# Patient Record
Sex: FEMALE | Race: AMERICAN INDIAN OR ALASKA NATIVE
[De-identification: names, ages, dates, MRNs, and addresses within clinical notes are randomized per-mention and may not be internally consistent; named-entity substitution may affect disease eponyms.]

---

## 2017-09-08 ENCOUNTER — HOSPITAL ENCOUNTER (EMERGENCY)
Dept: HOSPITAL 5 - ED | Age: 82
Discharge: HOME | End: 2017-09-08
Payer: MEDICARE

## 2017-09-08 VITALS — SYSTOLIC BLOOD PRESSURE: 129 MMHG | DIASTOLIC BLOOD PRESSURE: 47 MMHG

## 2017-09-08 DIAGNOSIS — R19.7: ICD-10-CM

## 2017-09-08 DIAGNOSIS — R10.84: Primary | ICD-10-CM

## 2017-09-08 LAB
ALBUMIN SERPL-MCNC: 3.8 G/DL (ref 3.9–5)
ALBUMIN/GLOB SERPL: 1.3 %
ALP SERPL-CCNC: 60 UNITS/L (ref 35–129)
ALT SERPL-CCNC: 8 UNITS/L (ref 7–56)
ANION GAP SERPL CALC-SCNC: 17 MMOL/L
BASOPHILS NFR BLD AUTO: 0.5 % (ref 0–1.8)
BILIRUB SERPL-MCNC: 0.7 MG/DL (ref 0.1–1.2)
BILIRUB UR QL STRIP: (no result)
BLOOD UR QL VISUAL: (no result)
BUN SERPL-MCNC: 21 MG/DL (ref 7–17)
BUN/CREAT SERPL: 26.25 %
CALCIUM SERPL-MCNC: 9.1 MG/DL (ref 8.4–10.2)
CHLORIDE SERPL-SCNC: 103.9 MMOL/L (ref 98–107)
CO2 SERPL-SCNC: 27 MMOL/L (ref 22–30)
EOSINOPHIL NFR BLD AUTO: 0.9 % (ref 0–4.3)
GLUCOSE SERPL-MCNC: 98 MG/DL (ref 65–100)
HCT VFR BLD CALC: 36 % (ref 30.3–42.9)
HGB BLD-MCNC: 12.1 GM/DL (ref 10.1–14.3)
KETONES UR STRIP-MCNC: (no result) MG/DL
LEUKOCYTE ESTERASE UR QL STRIP: (no result)
LIPASE SERPL-CCNC: 8 UNITS/L (ref 13–60)
MCH RBC QN AUTO: 30 PG (ref 28–32)
MCHC RBC AUTO-ENTMCNC: 34 % (ref 30–34)
MCV RBC AUTO: 91 FL (ref 79–97)
MUCOUS THREADS #/AREA URNS HPF: (no result) /HPF
NITRITE UR QL STRIP: (no result)
PH UR STRIP: 5 [PH] (ref 5–7)
PLATELET # BLD: 326 K/MM3 (ref 140–440)
POTASSIUM SERPL-SCNC: 4.4 MMOL/L (ref 3.6–5)
PROT SERPL-MCNC: 6.8 G/DL (ref 6.3–8.2)
PROT UR STRIP-MCNC: (no result) MG/DL
RBC # BLD AUTO: 3.97 M/MM3 (ref 3.65–5.03)
RBC #/AREA URNS HPF: 2 /HPF (ref 0–6)
SODIUM SERPL-SCNC: 143 MMOL/L (ref 137–145)
UROBILINOGEN UR-MCNC: < 2 MG/DL (ref ?–2)
WBC # BLD AUTO: 6.8 K/MM3 (ref 4.5–11)
WBC #/AREA URNS HPF: 1 /HPF (ref 0–6)

## 2017-09-08 PROCEDURE — 99284 EMERGENCY DEPT VISIT MOD MDM: CPT

## 2017-09-08 PROCEDURE — 80053 COMPREHEN METABOLIC PANEL: CPT

## 2017-09-08 PROCEDURE — 83690 ASSAY OF LIPASE: CPT

## 2017-09-08 PROCEDURE — 81001 URINALYSIS AUTO W/SCOPE: CPT

## 2017-09-08 PROCEDURE — 85025 COMPLETE CBC W/AUTO DIFF WBC: CPT

## 2017-09-08 PROCEDURE — 36415 COLL VENOUS BLD VENIPUNCTURE: CPT

## 2017-09-08 PROCEDURE — 74177 CT ABD & PELVIS W/CONTRAST: CPT

## 2017-09-08 NOTE — CAT SCAN REPORT
CT ABDOMEN AND PELVIS WITH CONTRAST



INDICATION: Abdominal pain.



COMPARISON: None similar at this institution.



FINDINGS: Abdomen and pelvis CT performed following intravenous 

administration of 100 cc of Omnipaque 300.



LUNG BASES: Cardiac shift to the right incompletely imaged.  Mild 

linear lingular scarring or atelectasis.  Mild nonspecific distal 

esophageal prominence/thickening.  No effusions.



ABDOMEN: Long segment mid to distal small bowel involvement noted with 

diffuse wall prominence/thickening, mild enhancement and increased 

mesenteric vascularity/stranding with minimal fluid along the 

mesenteric leaves as well, more so noted in the mid to lower abdomen 

and pelvis.  No bowel obstruction.  Stomach and proximal small bowel 

grossly within normal limits.  Mild colonic stool.  Small, somewhat 

lobulated left upper quadrant soft tissue may represent regenerated 

splenules, axial series 2, images 16-30.  Liver, gallbladder, pancreas, 

adrenals, aorta, IVC and kidneys within normal limits except for 

numerous bilateral renal cortical hypodensities, many subcentimeter 

while the largest left lower renal cortical cyst is 1.1 cm.  An 

approximately 1 cm right renal calyceal diverticulum also noted, axial 

image 9, series 4.  No ascites or definite significant adenopathy.  

Approximately 3.7 cm left flank hernia containing fat noted with a 

transverse neck of approximately 1.6 cm, axial image 35, series 2.



PELVIS: Small pelvic free fluid measures 20 HU.  Approximately 1 cm 

probably calcified fibroid on the left.  Small right hemipelvic 

phlebolith.  Rectal stool.  Normal urinary bladder.  No size 

significant adenopathy.  Bilateral inguinal canal region hernias, 

approximately 1.2 cm on the right and contains clear fat, axial image 

67, series 2 while measures approximately 2.8 cm on the left and 

contains stranded fat.



Approximately 7 mm anterolisthesis of L5 over S1 with vacuum disc 

phenomenon and severe disc narrowing.  T9 superior endplate Schmorl's 

nodes and approximately 25% loss of height also noted.  Mild lower 

lumbar degenerative spurring.  Demineralized bones.  Severe asymmetric 

right hip degenerative changes with joint space obliteration, spurring, 

sclerosis and numerous subchondral cysts.



CONCLUSION:



1. Long segment mid to distal small bowel wall thickening/inflammatory 

changes, worrisome for inflammatory bowel disease or enteritis, as 

described.  No evidence of bowel obstruction.



2.  Various other findings, including cardiac shift to the right, 

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 

hypodensities, left flank and bilateral inguinal hernias, L5-S1 

spondylolisthesis and severe right hip arthropathy, amongst others, as 

described.



Thank you for the opportunity to participate in this patient's care.

## 2017-09-08 NOTE — EMERGENCY DEPARTMENT REPORT
HPI





- General


Chief Complaint: Abdominal Pain


Time Seen by Provider: 09/08/17 12:53





- HPI


HPI: 


This is an 82 year-old  female presents to the emergency 

department from home with complaint of a one-week history of intermittent 

generalized abdominal pain and recently some diarrhea.  She says that her 

abdominal pain is very mild at this time if there at all, but when she does 

have this abdominal pain it is so severe that "I would not be able to talk to 

you."  She denies any fever, back pain, dysuria, vaginal bleeding, vaginal 

discharge.  She has not taken anything for her symptoms prior to presentation.  

No recent travel or sick contacts at home.  She denies any past medical 

history.  Her primary care physician is Dr. Zeina Naranjo.





ED Past Medical Hx





- Past Medical History


Previous Medical History?: Yes


Additional medical history: anemia





- Surgical History


Past Surgical History?: Yes


Additional Surgical History: arm surgery





- Social History


Smoking Status: Never Smoker


Substance Use Type: None





ED Review of Systems


ROS: 


Stated complaint: STOMACH PAIN/DIARRHEA


Other details as noted in HPI





Comment: All other systems reviewed and negative


Constitutional: denies: chills, fever


Eyes: denies: eye pain, eye discharge, vision change


ENT: denies: ear pain, throat pain


Respiratory: denies: cough, shortness of breath, wheezing


Cardiovascular: denies: chest pain, palpitations


Gastrointestinal: abdominal pain, diarrhea.  denies: nausea, vomiting


Genitourinary: denies: urgency, dysuria, discharge


Musculoskeletal: denies: back pain, joint swelling, arthralgia


Skin: denies: rash, lesions


Neurological: denies: headache, weakness, paresthesias





Physical Exam





- Physical Exam


Vital Signs: 


 Vital Signs











  09/08/17 09/08/17





  12:17 12:48


 


Temperature 97.9 F 97.6 F


 


Pulse Rate 52 L 50 L


 


Respiratory 18 18





Rate  


 


Blood Pressure 125/64 


 


Blood Pressure  115/48





[Right]  


 


O2 Sat by Pulse 99 99





Oximetry  











Physical Exam: 


GENERAL: The patient is well-developed well-nourished.


HENT: Normocephalic.  Atraumatic.    Patient has moist mucous membranes.


EYES: Extraocular motions are intact.  Pupils equal reactive to light 

bilaterally.


NECK: Supple.  Trachea is midline.


CHEST/LUNGS: Clear to auscultation.  There is no respiratory distress noted.


HEART/CARDIOVASCULAR: Regular.  There is no tachycardia.  There is no gallop 

rub or murmur.


ABDOMEN: Abdomen is soft, nontender.  Patient has normal bowel sounds.  There 

is no abdominal distention.


SKIN: There is no rash.  There is no edema.  There is no diaphoresis.


NEURO: The patient is awake, alert.  The patient is cooperative.  The patient 

has no focal neurologic deficits.  The patient has normal speech and gait.


MUSCULOSKELETAL: There is no tenderness or deformity.  There is no limitation 

range of motion.  There is no evidence of acute injury.








ED Course


 Vital Signs











  09/08/17 09/08/17





  12:17 12:48


 


Temperature 97.9 F 97.6 F


 


Pulse Rate 52 L 50 L


 


Respiratory 18 18





Rate  


 


Blood Pressure 125/64 


 


Blood Pressure  115/48





[Right]  


 


O2 Sat by Pulse 99 99





Oximetry  














ED Medical Decision Making





- Lab Data


Result diagrams: 


 09/08/17 12:21





 09/08/17 12:21





- Radiology Data


Radiology results: report reviewed





CT ABDOMEN AND PELVIS WITH CONTRAST 





INDICATION: Abdominal pain. 





COMPARISON: None similar at this institution. 





FINDINGS: Abdomen and pelvis CT performed following intravenous 


administration of 100 cc of Omnipaque 300. 





LUNG BASES: Cardiac shift to the right incompletely imaged. Mild 


linear lingular scarring or atelectasis. Mild nonspecific distal 


esophageal prominence/thickening. No effusions. 





ABDOMEN: Long segment mid to distal small bowel involvement noted with 


diffuse wall prominence/thickening, mild enhancement and increased 


mesenteric vascularity/stranding with minimal fluid along the 


mesenteric leaves as well, more so noted in the mid to lower abdomen 


and pelvis. No bowel obstruction. Stomach and proximal small bowel 


grossly within normal limits. Mild colonic stool. Small, somewhat 


lobulated left upper quadrant soft tissue may represent regenerated 


splenules, axial series 2, images 16-30. Liver, gallbladder, pancreas, 


adrenals, aorta, IVC and kidneys within normal limits except for 


numerous bilateral renal cortical hypodensities, many subcentimeter 


while the largest left lower renal cortical cyst is 1.1 cm. An 


approximately 1 cm right renal calyceal diverticulum also noted, axial 


image 9, series 4. No ascites or definite significant adenopathy. 


Approximately 3.7 cm left flank hernia containing fat noted with a 


transverse neck of approximately 1.6 cm, axial image 35, series 2. 





PELVIS: Small pelvic free fluid measures 20 HU. Approximately 1 cm 


probably calcified fibroid on the left. Small right hemipelvic 


phlebolith. Rectal stool. Normal urinary bladder. No size 


significant adenopathy. Bilateral inguinal canal region hernias, 


approximately 1.2 cm on the right and contains clear fat, axial image 


67, series 2 while measures approximately 2.8 cm on the left and 


contains stranded fat. 





Approximately 7 mm anterolisthesis of L5 over S1 with vacuum disc 


phenomenon and severe disc narrowing. T9 superior endplate Schmorl's 


nodes and approximately 25% loss of height also noted. Mild lower 


lumbar degenerative spurring. Demineralized bones. Severe asymmetric 


right hip degenerative changes with joint space obliteration, spurring, 


sclerosis and numerous subchondral cysts. 





CONCLUSION: 





1. Long segment mid to distal small bowel wall thickening/inflammatory 


changes, worrisome for inflammatory bowel disease or enteritis, as 


described. No evidence of bowel obstruction. 





2. Various other findings, including cardiac shift to the right, 


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 


hypodensities, left flank and bilateral inguinal hernias, L5-S1 


spondylolisthesis and severe right hip arthropathy, amongst others, as 


described. 





- Medical Decision Making


82-year-old female presents to the emergency department with a one-week history 

of intermittent abdominal pain and some recent diarrhea.  However she is 

asymptomatic at this time and throughout her ED course.  Labs are mostly 

unremarkable.  Vital signs stable.  CT of the abdomen and pelvis shows some 

inflammation and/or bowel thickening that could be IBS versus enteritis.  

Otherwise no acute process.  Patient appears stable for discharge home.  She 

has a primary care for follow-up and has been given gastroenterology.  She will 

return to the ER for any worsening of her symptoms or any acute distress.








- Differential Diagnosis


gastroenteritis, malignancy, IBS, diverticulitis


Critical Care Time: No


Critical care attestation.: 


If time is entered above; I have spent that time in minutes in the direct care 

of this critically ill patient, excluding procedure time.








ED Disposition


Clinical Impression: 


Abdominal pain


Qualifiers:


 Abdominal location: unspecified location Qualified Code(s): R10.9 - 

Unspecified abdominal pain





Diarrhea


Qualifiers:


 Diarrhea type: unspecified type Qualified Code(s): R19.7 - Diarrhea, 

unspecified





Disposition: DC-01 TO HOME OR SELFCARE


Is pt being admited?: No


Condition: Stable


Instructions:  Abdominal Pain (ED)


Additional Instructions: 


Follow-up with your primary care doctor in the next few days.  I have given you 

a referral for a local gastroenterologist, Dr. Ortiz, to follow up regarding 

her intermittent abdominal pains.  Return to the emergency Department with any 

worsening of your symptoms or any acute distress.


Referrals: 


PRIMARY CARE,MD [Primary Care Provider] - 3-5 Days


ANITA ORTIZ MD [Staff Physician] - 3-5 Days


ZEINA NARANJO MD [Staff Physician] - 3-5 Days


Time of Disposition: 16:54

## 2019-10-09 ENCOUNTER — HOSPITAL ENCOUNTER (OUTPATIENT)
Dept: HOSPITAL 5 - ED | Age: 84
Setting detail: OBSERVATION
LOS: 2 days | Discharge: HOME HEALTH | End: 2019-10-11
Attending: INTERNAL MEDICINE | Admitting: INTERNAL MEDICINE
Payer: MEDICARE

## 2019-10-09 DIAGNOSIS — R53.81: ICD-10-CM

## 2019-10-09 DIAGNOSIS — K21.9: ICD-10-CM

## 2019-10-09 DIAGNOSIS — Z86.2: ICD-10-CM

## 2019-10-09 DIAGNOSIS — R11.2: ICD-10-CM

## 2019-10-09 DIAGNOSIS — M19.90: ICD-10-CM

## 2019-10-09 DIAGNOSIS — K52.9: Primary | ICD-10-CM

## 2019-10-09 DIAGNOSIS — R27.0: ICD-10-CM

## 2019-10-09 DIAGNOSIS — Z79.899: ICD-10-CM

## 2019-10-09 LAB
ALBUMIN SERPL-MCNC: 3.6 G/DL (ref 3.9–5)
ALT SERPL-CCNC: 10 UNITS/L (ref 7–56)
APTT BLD: 33.8 SEC. (ref 24.2–36.6)
BACTERIA #/AREA URNS HPF: (no result) /HPF
BAND NEUTROPHILS # (MANUAL): 0 K/MM3
BILIRUB UR QL STRIP: (no result)
BLOOD UR QL VISUAL: (no result)
BUN SERPL-MCNC: 17 MG/DL (ref 7–17)
BUN/CREAT SERPL: 28 %
CALCIUM SERPL-MCNC: 9 MG/DL (ref 8.4–10.2)
HCT VFR BLD CALC: 33.7 % (ref 30.3–42.9)
HEMOLYSIS INDEX: 7
HGB BLD-MCNC: 11.1 GM/DL (ref 10.1–14.3)
INR PPP: 1.07 (ref 0.87–1.13)
MCHC RBC AUTO-ENTMCNC: 33 % (ref 30–34)
MCV RBC AUTO: 93 FL (ref 79–97)
MUCOUS THREADS #/AREA URNS HPF: (no result) /HPF
MYELOCYTES # (MANUAL): 0 K/MM3
PH UR STRIP: 5 [PH] (ref 5–7)
PLATELET # BLD: 263 K/MM3 (ref 140–440)
PROMYELOCYTES # (MANUAL): 0 K/MM3
PROT UR STRIP-MCNC: (no result) MG/DL
RBC # BLD AUTO: 3.62 M/MM3 (ref 3.65–5.03)
RBC #/AREA URNS HPF: 3 /HPF (ref 0–6)
TOTAL CELLS COUNTED BLD: 100
UROBILINOGEN UR-MCNC: < 2 MG/DL (ref ?–2)
WBC #/AREA URNS HPF: 2 /HPF (ref 0–6)

## 2019-10-09 PROCEDURE — 82550 ASSAY OF CK (CPK): CPT

## 2019-10-09 PROCEDURE — 70450 CT HEAD/BRAIN W/O DYE: CPT

## 2019-10-09 PROCEDURE — 85025 COMPLETE CBC W/AUTO DIFF WBC: CPT

## 2019-10-09 PROCEDURE — 85007 BL SMEAR W/DIFF WBC COUNT: CPT

## 2019-10-09 PROCEDURE — G0008 ADMIN INFLUENZA VIRUS VAC: HCPCS

## 2019-10-09 PROCEDURE — 99284 EMERGENCY DEPT VISIT MOD MDM: CPT

## 2019-10-09 PROCEDURE — 80320 DRUG SCREEN QUANTALCOHOLS: CPT

## 2019-10-09 PROCEDURE — 36415 COLL VENOUS BLD VENIPUNCTURE: CPT

## 2019-10-09 PROCEDURE — 90686 IIV4 VACC NO PRSV 0.5 ML IM: CPT

## 2019-10-09 PROCEDURE — 80053 COMPREHEN METABOLIC PANEL: CPT

## 2019-10-09 PROCEDURE — 85610 PROTHROMBIN TIME: CPT

## 2019-10-09 PROCEDURE — 93010 ELECTROCARDIOGRAM REPORT: CPT

## 2019-10-09 PROCEDURE — 85670 THROMBIN TIME PLASMA: CPT

## 2019-10-09 PROCEDURE — 82553 CREATINE MB FRACTION: CPT

## 2019-10-09 PROCEDURE — 84443 ASSAY THYROID STIM HORMONE: CPT

## 2019-10-09 PROCEDURE — G0480 DRUG TEST DEF 1-7 CLASSES: HCPCS

## 2019-10-09 PROCEDURE — 96361 HYDRATE IV INFUSION ADD-ON: CPT

## 2019-10-09 PROCEDURE — 93005 ELECTROCARDIOGRAM TRACING: CPT

## 2019-10-09 PROCEDURE — 85730 THROMBOPLASTIN TIME PARTIAL: CPT

## 2019-10-09 PROCEDURE — 84484 ASSAY OF TROPONIN QUANT: CPT

## 2019-10-09 PROCEDURE — 83735 ASSAY OF MAGNESIUM: CPT

## 2019-10-09 PROCEDURE — 87040 BLOOD CULTURE FOR BACTERIA: CPT

## 2019-10-09 PROCEDURE — G0378 HOSPITAL OBSERVATION PER HR: HCPCS

## 2019-10-09 PROCEDURE — 74176 CT ABD & PELVIS W/O CONTRAST: CPT

## 2019-10-09 PROCEDURE — 70551 MRI BRAIN STEM W/O DYE: CPT

## 2019-10-09 PROCEDURE — 71045 X-RAY EXAM CHEST 1 VIEW: CPT

## 2019-10-09 PROCEDURE — 87086 URINE CULTURE/COLONY COUNT: CPT

## 2019-10-09 PROCEDURE — 90471 IMMUNIZATION ADMIN: CPT

## 2019-10-09 PROCEDURE — 81001 URINALYSIS AUTO W/SCOPE: CPT

## 2019-10-09 PROCEDURE — 83036 HEMOGLOBIN GLYCOSYLATED A1C: CPT

## 2019-10-09 PROCEDURE — 97161 PT EVAL LOW COMPLEX 20 MIN: CPT

## 2019-10-09 PROCEDURE — 96360 HYDRATION IV INFUSION INIT: CPT

## 2019-10-09 RX ADMIN — MIRTAZAPINE SCH MG: 15 TABLET, FILM COATED ORAL at 22:48

## 2019-10-09 RX ADMIN — FAMOTIDINE SCH MG: 20 TABLET ORAL at 22:48

## 2019-10-09 RX ADMIN — CALCIUM CARBONATE (ANTACID) CHEW TAB 500 MG SCH MG: 500 CHEW TAB at 22:48

## 2019-10-09 RX ADMIN — Medication SCH ML: at 22:49

## 2019-10-09 NOTE — CAT SCAN REPORT
CT HEAD WITHOUT CONTRAST



HISTORY: Stroke symptoms.



TECHNIQUE:  Axial imaging performed from the skull apex through the skull base without the use of con
trast.  All CT scans at this location are performed using CT dose reduction for ALARA by means of aut
omated exposure control. 



COMPARISON:  None



FINDINGS:  



Parenchyma:  No acute intracranial hemorrhage or parenchymal abnormality..  Mild hypoattenuation thro
ughout the white matter is noted and consistent with chronic microvascular ischemic disease.

Ventricles:  There is mild diffuse brain atrophy with commensurate ventricular enlargement which is l
ikely age appropriate.  

Soft tissues:  Soft tissues including the orbits appear normal.   

Bones:  No acute osseous abnormality.   

Sinuses:  Sinuses and mastoid air cells are clear.





IMPRESSION: No acute abnormality. Senescent changes.



Signer Name: Dionte Pal Jr, MD 

Signed: 10/9/2019 11:22 AM

 Workstation Name: [id number]

## 2019-10-09 NOTE — EMERGENCY DEPARTMENT REPORT
ED General Adult HPI





- General


Chief complaint: Nausea/Vomiting/Diarrhea


Stated complaint: VOMITING/DIARRHEA


Time Seen by Provider: 10/09/19 09:27


Source: patient, family, EMS (ems notes not available at time of  chart 

dictation), RN notes reviewed, old records reviewed


Mode of arrival: Stretcher


Limitations: No Limitations





- History of Present Illness


Initial comments: 





This is an 84-year-old female.





Her primary care doctor is Dr. Sandoval.





Past medical history includes possible anemia or arm surgery.





History of primarily obtained from her son.





Apparently, the patient is brought to the hospital for resolves "dizziness", and

generalized weakness.  Patient indicates that she was in her usual state of 

health, walking down the stairs, when she heard some loud noise outside, and 

then began to feel dizzy.  She cannot describe the qualitative nature of the 

dizziness.  She typically walks with a walker, and as per her son, the indicates

that she was having difficulty and been living with a walker.  She denies 

physical pain at this time.  She felt nauseous, and may have vomited once.





The patient states that she is back to her baseline at this time.  She has no 

complaints at this time.  Her son is at the bedside and corroborates that she is

at her baseline.  Son offers additional history, apparently, the patient's had 

decreased appetite, weight loss, her primary care doctor put her on a 

"antidepressant", but he does not know what medicine is.  The patient and the 

son do not have a list of her medications at this time.








-: Sudden


Improves with: none


Worsens with: none





- Related Data


                                    Allergies











Allergy/AdvReac Type Severity Reaction Status Date / Time


 


No Known Allergies Allergy   Unverified 17 12:18














ED Review of Systems


ROS: 


Stated complaint: VOMITING/DIARRHEA


Other details as noted in HPI





Constitutional: malaise, weakness.  denies: fever


Eyes: denies: eye discharge


ENT: denies: epistaxis


Respiratory: denies: cough


Cardiovascular: denies: syncope


Gastrointestinal: nausea, vomiting


Genitourinary: denies: dysuria


Musculoskeletal: denies: back pain


Skin: denies: lesions


Neurological: weakness


Psychiatric: depression


Hematological/Lymphatic: denies: easy bleeding





ED Past Medical Hx





- Past Medical History


Additional medical history: anemia





- Surgical History


Additional Surgical History: arm surgery





- Social History


Smoking Status: Unknown if ever smoked





ED Physical Exam





- General


Limitations: No Limitations, Other (the patient is a poor historian.)


General appearance: alert, in no apparent distress





- Head


Head exam: Present: atraumatic, normocephalic





- Eye


Eye exam: Present: normal appearance, PERRL, EOMI, other (visual acuity is 

intact to finger counting and color perception at close distance).  Absent: 

nystagmus





- ENT


ENT exam: Present: normal exam, normal orophraynx, mucous membranes moist, norm

al external ear exam





- Neck


Neck exam: Present: normal inspection, full ROM.  Absent: tenderness, 

meningismus





- Respiratory


Respiratory exam: Present: normal lung sounds bilaterally.  Absent: respiratory 

distress





- Cardiovascular


Cardiovascular Exam: Present: normal rhythm, bradycardia, normal heart sounds.  

Absent: tachycardia, irregular rhythm, systolic murmur, diastolic murmur, rubs, 

gallop





- GI/Abdominal


GI/Abdominal exam: Present: soft.  Absent: distended, tenderness, guarding, 

rebound, rigid, pulsatile mass





- Extremities Exam


Extremities exam: Present: normal inspection, full ROM, other (2+ pulses noted 

in the bilateral upper, lower extremities.  Compartments soft.  No long bony 

tenderness.  The pelvis is stable.).  Absent: calf tenderness





- Back Exam


Back exam: Present: normal inspection.  Absent: tenderness, CVA tenderness (R), 

CVA tenderness (L), paraspinal tenderness, vertebral tenderness





- Neurological Exam


Neurological exam: Present: alert, oriented X3, other (there is no facial droop.

 The tongue is midline.  The extraocular movements are intact bilaterally.  The 

tongue is midline.  Speaking in complete sentences.  There is no past-pointing. 

There is no pronator drift.  Patient has difficulty with heel-to-shin secondary 

to chronic arthritic pain)





- Psychiatric


Psychiatric exam: Present: normal affect, normal mood





- Skin


Skin exam: Present: warm, dry, intact, normal color.  Absent: rash





ED Course


                                   Vital Signs











  10/09/19 10/09/19 10/09/19





  09:38 09:46 12:00


 


Temperature   96.6 F L


 


Pulse Rate  54 L 56 L


 


Respiratory  12 16





Rate   


 


Blood Pressure  128/56 


 


Blood Pressure   124/51





[Right]   


 


O2 Sat by Pulse 98  96





Oximetry   














  10/09/19





  12:05


 


Temperature 


 


Pulse Rate 


 


Respiratory 14





Rate 


 


Blood Pressure 


 


Blood Pressure 





[Right] 


 


O2 Sat by Pulse 





Oximetry 














- Reevaluation(s)


Reevaluation #1: 





10/09/19 11:32


Differential diagnosis, including not limited to: Orthostasis, vagal event, 

structural cardiac disease, pneumonia, stroke, TIA, urinary tract infection, 

deconditioning, peripheral vertigo, obstruction, colitis, diverticulitis








Assessment and plan: 84-year-old female brought to the hospital for resolved 

unsteady gait, dizziness, nausea.  Symptoms are nonspecific.  She is back to her

 baseline at this time.  She is not tachycardic or hypoxic.  Abdomen soft and 

benign.  CT scan of the brain, abdomen and pelvis ordered.  Screening laboratory

 studies, repeat EKG, urinalysis, medication reconciliation ordered.  Patient 

currently has NIH score of 0, therefore, TPA candidate.








Her examination at this time is not suggestive of a large vessel occlusion.


Therefore, an emergent CT angiogram of the head and neck is not indicated.


Reevaluation #2: 





10/09/19 12:23


CT scan of the brain is negative for acute disease.  CT scan abdomen and pelvis 

shows chronic findings and constipation.  Urinalysis is pending at this time.  

Case presented to Hospital physician, Dr. Bella











ED Medical Decision Making





- Lab Data


Result diagrams: 


                                 10/09/19 10:10





                                 10/09/19 10:10








                                   Lab Results











  10/09/19 10/09/19 10/09/19 Range/Units





  10:10 10:10 10:10 


 


WBC  15.0 H    (4.5-11.0)  K/mm3


 


RBC  3.62 L    (3.65-5.03)  M/mm3


 


Hgb  11.1    (10.1-14.3)  gm/dl


 


Hct  33.7    (30.3-42.9)  %


 


MCV  93    (79-97)  fl


 


MCH  31    (28-32)  pg


 


MCHC  33    (30-34)  %


 


RDW  13.5    (13.2-15.2)  %


 


Plt Count  263    (140-440)  K/mm3


 


Add Manual Diff  Complete    


 


Total Counted  100    


 


Seg Neutrophils %  Np    


 


Seg Neuts % (Manual)  94.0 H    (40.0-70.0)  %


 


Band Neutrophils %  0    %


 


Lymphocytes % (Manual)  2.0 L    (13.4-35.0)  %


 


Reactive Lymphs % (Man)  0    %


 


Monocytes % (Manual)  4.0    (0.0-7.3)  %


 


Eosinophils % (Manual)  0    (0.0-4.3)  %


 


Basophils % (Manual)  0    (0.0-1.8)  %


 


Metamyelocytes %  0    %


 


Myelocytes %  0    %


 


Promyelocytes %  0    %


 


Blast Cells %  0    %


 


Nucleated RBC %  Not Reportable    


 


Seg Neutrophils # Man  14.1 H    (1.8-7.7)  K/mm3


 


Band Neutrophils #  0.0    K/mm3


 


Lymphocytes # (Manual)  0.3 L    (1.2-5.4)  K/mm3


 


Abs React Lymphs (Man)  0.0    K/mm3


 


Monocytes # (Manual)  0.6    (0.0-0.8)  K/mm3


 


Eosinophils # (Manual)  0.0    (0.0-0.4)  K/mm3


 


Basophils # (Manual)  0.0    (0.0-0.1)  K/mm3


 


Metamyelocytes #  0.0    K/mm3


 


Myelocytes #  0.0    K/mm3


 


Promyelocytes #  0.0    K/mm3


 


Blast Cells #  0.0    K/mm3


 


WBC Morphology  Not Reportable    


 


Hypersegmented Neuts  Not Reportable    


 


Hyposegmented Neuts  Not Reportable    


 


Hypogranular Neuts  Not Reportable    


 


Smudge Cells  Not Reportable    


 


Toxic Granulation  Not Reportable    


 


Toxic Vacuolation  Not Reportable    


 


Dohle Bodies  Not Reportable    


 


Pelger-Huet Anomaly  Not Reportable    


 


George Rods  Not Reportable    


 


Platelet Estimate  Consistent w auto    


 


Clumped Platelets  Not Reportable    


 


Plt Clumps, EDTA  Not Reportable    


 


Large Platelets  Not Reportable    


 


Giant Platelets  Not Reportable    


 


Platelet Satelliting  Not Reportable    


 


Plt Morphology Comment  Not Reportable    


 


RBC Morphology  Normal    


 


Dimorphic RBCs  Not Reportable    


 


Polychromasia  Not Reportable    


 


Hypochromasia  Not Reportable    


 


Poikilocytosis  Not Reportable    


 


Anisocytosis  Not Reportable    


 


Microcytosis  Not Reportable    


 


Macrocytosis  Not Reportable    


 


Spherocytes  Not Reportable    


 


Pappenheimer Bodies  Not Reportable    


 


Sickle Cells  Not Reportable    


 


Target Cells  Not Reportable    


 


Tear Drop Cells  Not Reportable    


 


Ovalocytes  Not Reportable    


 


Helmet Cells  Not Reportable    


 


[place] Bodies  Not Reportable    


 


Cabot Rings  Not Reportable    


 


Christopher Cells  Not Reportable    


 


Bite Cells  Not Reportable    


 


Crenated Cell  Not Reportable    


 


Elliptocytes  Not Reportable    


 


Acanthocytes (Spur)  Not Reportable    


 


Rouleaux  Not Reportable    


 


Hemoglobin C Crystals  Not Reportable    


 


Schistocytes  Not Reportable    


 


Malaria parasites  Not Reportable    


 


Kobe Bodies  Not Reportable    


 


Hem Pathologist Commnt  No    


 


PT   13.6   (12.2-14.9)  Sec.


 


INR   1.07   (0.87-1.13)  


 


APTT   33.8   (24.2-36.6)  Sec.


 


Thrombin Time   15.1   (15.1-19.6)  Sec.


 


Sodium    141  (137-145)  mmol/L


 


Potassium    4.3  (3.6-5.0)  mmol/L


 


Chloride    102.8  ()  mmol/L


 


Carbon Dioxide    26  (22-30)  mmol/L


 


Anion Gap    17  mmol/L


 


BUN    17  (7-17)  mg/dL


 


Creatinine    0.6 L  (0.7-1.2)  mg/dL


 


Estimated GFR    > 60  ml/min


 


BUN/Creatinine Ratio    28  %


 


Glucose    125 H  ()  mg/dL


 


Calcium    9.0  (8.4-10.2)  mg/dL


 


Magnesium    2.00  (1.7-2.3)  mg/dL


 


Total Bilirubin    1.00  (0.1-1.2)  mg/dL


 


AST    16  (5-40)  units/L


 


ALT    10  (7-56)  units/L


 


Alkaline Phosphatase    67  ()  units/L


 


Total Creatine Kinase    68  ()  units/L


 


CK-MB (CK-2)    2.5  (0.0-4.0)  ng/mL


 


CK-MB (CK-2) Rel Index    3.6  (0-4)  


 


Troponin T    < 0.010  (0.00-0.029)  ng/mL


 


Total Protein    7.0  (6.3-8.2)  g/dL


 


Albumin    3.6 L  (3.9-5)  g/dL


 


Albumin/Globulin Ratio    1.1  %


 


TSH     (0.270-4.200)  mlU/mL


 


Salicylates     (2.8-20.0)  mg/dL














  10/09/19 10/09/19 Range/Units





  10:10 10:10 


 


WBC    (4.5-11.0)  K/mm3


 


RBC    (3.65-5.03)  M/mm3


 


Hgb    (10.1-14.3)  gm/dl


 


Hct    (30.3-42.9)  %


 


MCV    (79-97)  fl


 


MCH    (28-32)  pg


 


MCHC    (30-34)  %


 


RDW    (13.2-15.2)  %


 


Plt Count    (140-440)  K/mm3


 


Add Manual Diff    


 


Total Counted    


 


Seg Neutrophils %    


 


Seg Neuts % (Manual)    (40.0-70.0)  %


 


Band Neutrophils %    %


 


Lymphocytes % (Manual)    (13.4-35.0)  %


 


Reactive Lymphs % (Man)    %


 


Monocytes % (Manual)    (0.0-7.3)  %


 


Eosinophils % (Manual)    (0.0-4.3)  %


 


Basophils % (Manual)    (0.0-1.8)  %


 


Metamyelocytes %    %


 


Myelocytes %    %


 


Promyelocytes %    %


 


Blast Cells %    %


 


Nucleated RBC %    


 


Seg Neutrophils # Man    (1.8-7.7)  K/mm3


 


Band Neutrophils #    K/mm3


 


Lymphocytes # (Manual)    (1.2-5.4)  K/mm3


 


Abs React Lymphs (Man)    K/mm3


 


Monocytes # (Manual)    (0.0-0.8)  K/mm3


 


Eosinophils # (Manual)    (0.0-0.4)  K/mm3


 


Basophils # (Manual)    (0.0-0.1)  K/mm3


 


Metamyelocytes #    K/mm3


 


Myelocytes #    K/mm3


 


Promyelocytes #    K/mm3


 


Blast Cells #    K/mm3


 


WBC Morphology    


 


Hypersegmented Neuts    


 


Hyposegmented Neuts    


 


Hypogranular Neuts    


 


Smudge Cells    


 


Toxic Granulation    


 


Toxic Vacuolation    


 


Dohle Bodies    


 


Pelger-Huet Anomaly    


 


George Rods    


 


Platelet Estimate    


 


Clumped Platelets    


 


Plt Clumps, EDTA    


 


Large Platelets    


 


Giant Platelets    


 


Platelet Satelliting    


 


Plt Morphology Comment    


 


RBC Morphology    


 


Dimorphic RBCs    


 


Polychromasia    


 


Hypochromasia    


 


Poikilocytosis    


 


Anisocytosis    


 


Microcytosis    


 


Macrocytosis    


 


Spherocytes    


 


Pappenheimer Bodies    


 


Sickle Cells    


 


Target Cells    


 


Tear Drop Cells    


 


Ovalocytes    


 


Helmet Cells    


 


[place] Bodies    


 


Cabot Rings    


 


Christopher Cells    


 


Bite Cells    


 


Crenated Cell    


 


Elliptocytes    


 


Acanthocytes (Spur)    


 


Rouleaux    


 


Hemoglobin C Crystals    


 


Schistocytes    


 


Malaria parasites    


 


Kobe Bodies    


 


Hem Pathologist Commnt    


 


PT    (12.2-14.9)  Sec.


 


INR    (0.87-1.13)  


 


APTT    (24.2-36.6)  Sec.


 


Thrombin Time    (15.1-19.6)  Sec.


 


Sodium    (137-145)  mmol/L


 


Potassium    (3.6-5.0)  mmol/L


 


Chloride    ()  mmol/L


 


Carbon Dioxide    (22-30)  mmol/L


 


Anion Gap    mmol/L


 


BUN    (7-17)  mg/dL


 


Creatinine    (0.7-1.2)  mg/dL


 


Estimated GFR    ml/min


 


BUN/Creatinine Ratio    %


 


Glucose    ()  mg/dL


 


Calcium    (8.4-10.2)  mg/dL


 


Magnesium    (1.7-2.3)  mg/dL


 


Total Bilirubin    (0.1-1.2)  mg/dL


 


AST    (5-40)  units/L


 


ALT    (7-56)  units/L


 


Alkaline Phosphatase    ()  units/L


 


Total Creatine Kinase    ()  units/L


 


CK-MB (CK-2)    (0.0-4.0)  ng/mL


 


CK-MB (CK-2) Rel Index    (0-4)  


 


Troponin T    (0.00-0.029)  ng/mL


 


Total Protein    (6.3-8.2)  g/dL


 


Albumin    (3.9-5)  g/dL


 


Albumin/Globulin Ratio    %


 


TSH  3.800   (0.270-4.200)  mlU/mL


 


Salicylates   < 0.3 L  (2.8-20.0)  mg/dL











                                   Vital Signs











  10/09/19 10/09/19 10/09/19





  09:38 09:46 12:00


 


Temperature   96.6 F L


 


Pulse Rate  54 L 56 L


 


Respiratory  12 16





Rate   


 


Blood Pressure  128/56 


 


Blood Pressure   124/51





[Right]   


 


O2 Sat by Pulse 98  96





Oximetry   














  10/09/19





  12:05


 


Temperature 


 


Pulse Rate 


 


Respiratory 14





Rate 


 


Blood Pressure 


 


Blood Pressure 





[Right] 


 


O2 Sat by Pulse 





Oximetry 














- EKG Data


-: EKG Interpreted by Me


EKG shows normal: sinus rhythm


Rate: bradycardia





- EKG Data





10/09/19 11:32


The EKG is limited by motion artifact.  Rate is 51 bpm, normal axis, QTC is 407 

ms, there is significant motion artifact, however, the EKG appears to be a sinus

 rhythm.  Repeat EKG is ordered





- Radiology Data


Radiology results: report reviewed, image reviewed





Print Report











Referring Physician: ALEXANDREA BARKER 


 


Patient Name: MARYAM CHEN 


 


Patient ID: [id number] 


 


YOB: 1935 


 


Sex: Female 


 


Accession: [id number] 


 


Report Date: 2019-10-09 


 


Report Status: Finalized 


 


Findings


[facility], [place] [address] [place] [address] 

XRay Report Signed Patient: MARYAM CHEN MR#: [id number] : 1935 

Acct:[id number] Age/Sex: 84 / F ADM Date: 10/09/19 Loc: ED Attending Dr: 

Ordering Physician: ALEXANDREA BARKER MD Date of Service: 10/09/19 Procedure(s):

 XR chest 1V ap Accession Number(s): [id number] cc: ALEXANDREA BARKER MD Fluoro 

Time In Minutes: CHEST 1 VIEW INDICATION: weak n/v sob. COMPARISON: No compar

fiona chest x-ray but there is a comparison CT abdomen from 2017 FINDINGS: 

Support devices: None. Heart: Cardiac enlargement and dextrocardia as identified

 on previous CT abdomen. Pulmonary vasculature: Normal. Lungs/Pleura: The lungs 

are normally expanded and clear. No pleural effusion. Additional findings: A 

left aortic arch which descends on the left side. IMPRESSION: 1. No acute 

findings. 2. Dextrocardia and cardiac enlargement but no CHF. Signer Name: 

Alicia Disla MD Signed: 10/9/2019 10:25 AM Workstation Name: [id number] 

Transcribed By: REF Dictated By: ALICIA DISLA MD Electronically 

Authenticated By: ALICIA DISLA MD Signed Date/Time: 10/09/19 1025   











Print Report











Referring Physician: JULIA SHARP 


 


Patient Name: MARYAM CHEN 


 


Patient ID: [id number] 


 


YOB: 1935 


 


Sex: Female 


 


Accession: [id number] 


 


Report Date: 2017 


 


Report Status: Finalized 


 


Findings


[address] [address] [place]

 Scan Report Signed Patient: MARYAM CHEN MR#: [id number] : 1935 

Acct:[id number] Age/Sex: 82 / F ADM Date: 17 Loc: ED Attending Dr: 

Ordering Physician: JULIA SHARP DO Date of Service: 17 Procedure(s): CT

 abdomen pelvis w con Accession Number(s): [id number] cc: JULIA SHARP DO CT AB

DOMEN AND PELVIS WITH CONTRAST INDICATION: Abdominal pain. COMPARISON: None 

similar at this institution. FINDINGS: Abdomen and pelvis CT performed following

 intravenous administration of 100 cc of Omnipaque 300. LUNG BASES: Cardiac 

shift to the right incompletely imaged. Mild linear lingular scarring or 

atelectasis. Mild nonspecific distal esophageal prominence/thickening. No 

effusions. ABDOMEN: Long segment mid to distal small bowel involvement noted 

with diffuse wall prominence/thickening, mild enhancement and increased 

mesenteric vascularity/stranding with minimal fluid along the mesenteric leaves 

as well, more so noted in the mid to lower abdomen and pelvis. No bowel 

obstruction. Stomach and proximal small bowel grossly within normal limits. Mild

 colonic stool. Small, somewhat lobulated left upper quadrant soft tissue may 

represent regenerated splenules, axial series 2, images 16-30. Liver, 

gallbladder, pancreas, adrenals, aorta, IVC and kidneys within normal limits 

except for numerous bilateral renal cortical hypodensities, many subcentimeter 

while the largest left lower renal cortical cyst is 1.1 cm. An approximately 1 

cm right renal calyceal diverticulum also noted, axial image 9, series 4. No 

ascites or definite significant adenopathy. Approximately 3.7 cm left flank miguel ángel

ia containing fat noted with a transverse neck of approximately 1.6 cm, axial 

image 35, series 2. PELVIS: Small pelvic free fluid measures 20 HU. 

Approximately 1 cm probably calcified fibroid on the left. Small right 

hemipelvic phlebolith. Rectal stool. Normal urinary bladder. No size significant

 adenopathy. Bilateral inguinal canal region hernias, approximately 1.2 cm on 

the right and contains clear fat, axial image 67, series 2 while measures 

approximately 2.8 cm on the left and contains stranded fat. Approximately 7 mm 

anterolisthesis of L5 over S1 with vacuum disc phenomenon and severe disc 

narrowing. T9 superior endplate Schmorl's nodes and approximately 25% loss of 

height also noted. Mild lower lumbar degenerative spurring. Demineralized bones.

 Severe asymmetric right hip degenerative changes with joint space obliteration,

 spurring, sclerosis and numerous subchondral cysts. CONCLUSION: 1. Long segment

 mid to distal small bowel wall thickening/inflammatory changes, worrisome for i

nflammatory bowel disease or enteritis, as described. No evidence of bowel 

obstruction. 2. Various other findings, including cardiac shift to the right, 

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 

hypodensities, left flank and bilateral inguinal hernias, L5-S1 spondylolist

hesis and severe right hip arthropathy, amongst others, as described. Thank you 

for the opportunity to participate in this patient's care. Transcribed By: RS 

Dictated By: DARLENE GOODMAN MD Electronically Authenticated By: DARLENE GOODMAN MD Signed Date/Time: 17 DD/DT: 17 1429 TD/TT: 17 

1500   














Critical care attestation.: 


If time is entered above; I have spent that time in minutes in the direct care 

of this critically ill patient, excluding procedure time.








ED Disposition


Clinical Impression: 


 History of unsteady gait, Dizziness





Disposition: - OP ADMIT IP TO THIS HOSP


Is pt being admited?: Yes


Does the pt Need Aspirin: Yes


Condition: Stable


Referrals: 


PRIMARY CARE,MD [Primary Care Provider] - 3-5 Days





- Assessment


Assessment Interval: Baseline





- Level of Consciousness


1a. Level of Consciousness: alert/keenly responsive





- LOC Questions


1b. LOC Questions: answers both correctly





- LOC Command


1c. LOC Commands: performs tasks correctly





- Best Gaze


2. Best Gaze: normal





- Visual


3. Visual: no visual loss





- Facial Palsy


4. Facial Palsy: normal symmetrical movement





- Motor Arm


5a. Motor Arm Left: no drift


5b. Motor Arm Right: no drift





- Motor Leg


6a. Motor Leg Left: no drift


6b. Motor Leg Right: no drift





- Limb Ataxia


7. Limb Ataxia: absent





- Sensory


8. Sensory: normal





- Best Language


9. Best Language: no aphasia





- Dysarthria


10. Dysarthria: normal





- Extinction and Inattention


11. Extinction/Inattention: no abnormality





- Scoring


Total Score: 0


Stroke Severity: No Stroke Symptoms

## 2019-10-09 NOTE — HISTORY AND PHYSICAL REPORT
History of Present Illness


Date of examination: 10/09/19


Date of admission: 


10/09/19 12:25








Medications and Allergies


                                    Allergies











Allergy/AdvReac Type Severity Reaction Status Date / Time


 


No Known Allergies Allergy   Unverified 09/08/17 12:18











                                Home Medications











 Medication  Instructions  Recorded  Confirmed  Last Taken  Type


 


Calcium Carbonate [Tums 500MG CHEW] 500 mg PO DAILY 10/09/19 10/09/19 Unknown 

History


 


Mirtazapine [Remeron 15mg TAB] 15 mg PO DAILY 10/09/19 10/09/19 10/09/19 History


 


raNITIdine HCl [Zantac] 150 mg PO QDAY 10/09/19 10/09/19 10/09/19 History














Exam





- Constitutional


Vitals: 


                                        











Temp Pulse Resp BP Pulse Ox


 


 97.9 F   55 L  18   114/46   96 


 


 10/09/19 20:10  10/09/19 20:10  10/09/19 20:10  10/09/19 20:10  10/09/19 20:10














Results





- Labs


CBC & Chem 7: 


                                 10/09/19 10:10





                                 10/09/19 10:10


Labs: 


                             Laboratory Last Values











WBC  15.0 K/mm3 (4.5-11.0)  H  10/09/19  10:10    


 


RBC  3.62 M/mm3 (3.65-5.03)  L  10/09/19  10:10    


 


Hgb  11.1 gm/dl (10.1-14.3)   10/09/19  10:10    


 


Hct  33.7 % (30.3-42.9)   10/09/19  10:10    


 


MCV  93 fl (79-97)   10/09/19  10:10    


 


MCH  31 pg (28-32)   10/09/19  10:10    


 


MCHC  33 % (30-34)   10/09/19  10:10    


 


RDW  13.5 % (13.2-15.2)   10/09/19  10:10    


 


Plt Count  263 K/mm3 (140-440)   10/09/19  10:10    


 


Add Manual Diff  Complete   10/09/19  10:10    


 


Total Counted  100   10/09/19  10:10    


 


Seg Neutrophils %  Np   10/09/19  10:10    


 


Seg Neuts % (Manual)  94.0 % (40.0-70.0)  H  10/09/19  10:10    


 


Band Neutrophils %  0 %  10/09/19  10:10    


 


Lymphocytes % (Manual)  2.0 % (13.4-35.0)  L  10/09/19  10:10    


 


Reactive Lymphs % (Man)  0 %  10/09/19  10:10    


 


Monocytes % (Manual)  4.0 % (0.0-7.3)   10/09/19  10:10    


 


Eosinophils % (Manual)  0 % (0.0-4.3)   10/09/19  10:10    


 


Basophils % (Manual)  0 % (0.0-1.8)   10/09/19  10:10    


 


Metamyelocytes %  0 %  10/09/19  10:10    


 


Myelocytes %  0 %  10/09/19  10:10    


 


Promyelocytes %  0 %  10/09/19  10:10    


 


Blast Cells %  0 %  10/09/19  10:10    


 


Nucleated RBC %  Not Reportable   10/09/19  10:10    


 


Seg Neutrophils # Man  14.1 K/mm3 (1.8-7.7)  H  10/09/19  10:10    


 


Band Neutrophils #  0.0 K/mm3  10/09/19  10:10    


 


Lymphocytes # (Manual)  0.3 K/mm3 (1.2-5.4)  L  10/09/19  10:10    


 


Abs React Lymphs (Man)  0.0 K/mm3  10/09/19  10:10    


 


Monocytes # (Manual)  0.6 K/mm3 (0.0-0.8)   10/09/19  10:10    


 


Eosinophils # (Manual)  0.0 K/mm3 (0.0-0.4)   10/09/19  10:10    


 


Basophils # (Manual)  0.0 K/mm3 (0.0-0.1)   10/09/19  10:10    


 


Metamyelocytes #  0.0 K/mm3  10/09/19  10:10    


 


Myelocytes #  0.0 K/mm3  10/09/19  10:10    


 


Promyelocytes #  0.0 K/mm3  10/09/19  10:10    


 


Blast Cells #  0.0 K/mm3  10/09/19  10:10    


 


WBC Morphology  Not Reportable   10/09/19  10:10    


 


Hypersegmented Neuts  Not Reportable   10/09/19  10:10    


 


Hyposegmented Neuts  Not Reportable   10/09/19  10:10    


 


Hypogranular Neuts  Not Reportable   10/09/19  10:10    


 


Smudge Cells  Not Reportable   10/09/19  10:10    


 


Toxic Granulation  Not Reportable   10/09/19  10:10    


 


Toxic Vacuolation  Not Reportable   10/09/19  10:10    


 


Dohle Bodies  Not Reportable   10/09/19  10:10    


 


Pelger-Huet Anomaly  Not Reportable   10/09/19  10:10    


 


George Rods  Not Reportable   10/09/19  10:10    


 


Platelet Estimate  Consistent w auto   10/09/19  10:10    


 


Clumped Platelets  Not Reportable   10/09/19  10:10    


 


Plt Clumps, EDTA  Not Reportable   10/09/19  10:10    


 


Large Platelets  Not Reportable   10/09/19  10:10    


 


Giant Platelets  Not Reportable   10/09/19  10:10    


 


Platelet Satelliting  Not Reportable   10/09/19  10:10    


 


Plt Morphology Comment  Not Reportable   10/09/19  10:10    


 


RBC Morphology  Normal   10/09/19  10:10    


 


Dimorphic RBCs  Not Reportable   10/09/19  10:10    


 


Polychromasia  Not Reportable   10/09/19  10:10    


 


Hypochromasia  Not Reportable   10/09/19  10:10    


 


Poikilocytosis  Not Reportable   10/09/19  10:10    


 


Anisocytosis  Not Reportable   10/09/19  10:10    


 


Microcytosis  Not Reportable   10/09/19  10:10    


 


Macrocytosis  Not Reportable   10/09/19  10:10    


 


Spherocytes  Not Reportable   10/09/19  10:10    


 


Pappenheimer Bodies  Not Reportable   10/09/19  10:10    


 


Sickle Cells  Not Reportable   10/09/19  10:10    


 


Target Cells  Not Reportable   10/09/19  10:10    


 


Tear Drop Cells  Not Reportable   10/09/19  10:10    


 


Ovalocytes  Not Reportable   10/09/19  10:10    


 


Helmet Cells  Not Reportable   10/09/19  10:10    


 


[place] Bodies  Not Reportable   10/09/19  10:10    


 


Cabot Rings  Not Reportable   10/09/19  10:10    


 


[place] Cells  Not Reportable   10/09/19  10:10    


 


Bite Cells  Not Reportable   10/09/19  10:10    


 


Crenated Cell  Not Reportable   10/09/19  10:10    


 


Elliptocytes  Not Reportable   10/09/19  10:10    


 


Acanthocytes (Spur)  Not Reportable   10/09/19  10:10    


 


Rouleaux  Not Reportable   10/09/19  10:10    


 


Hemoglobin C Crystals  Not Reportable   10/09/19  10:10    


 


Schistocytes  Not Reportable   10/09/19  10:10    


 


Malaria parasites  Not Reportable   10/09/19  10:10    


 


[place] Bodies  Not Reportable   10/09/19  10:10    


 


Hem Pathologist Commnt  No   10/09/19  10:10    


 


PT  13.6 Sec. (12.2-14.9)   10/09/19  10:10    


 


INR  1.07  (0.87-1.13)   10/09/19  10:10    


 


APTT  33.8 Sec. (24.2-36.6)   10/09/19  10:10    


 


Thrombin Time  15.1 Sec. (15.1-19.6)   10/09/19  10:10    


 


Sodium  141 mmol/L (137-145)   10/09/19  10:10    


 


Potassium  4.3 mmol/L (3.6-5.0)   10/09/19  10:10    


 


Chloride  102.8 mmol/L ()   10/09/19  10:10    


 


Carbon Dioxide  26 mmol/L (22-30)   10/09/19  10:10    


 


Anion Gap  17 mmol/L  10/09/19  10:10    


 


BUN  17 mg/dL (7-17)   10/09/19  10:10    


 


Creatinine  0.6 mg/dL (0.7-1.2)  L  10/09/19  10:10    


 


Estimated GFR  > 60 ml/min  10/09/19  10:10    


 


BUN/Creatinine Ratio  28 %  10/09/19  10:10    


 


Glucose  125 mg/dL ()  H  10/09/19  10:10    


 


Calcium  9.0 mg/dL (8.4-10.2)   10/09/19  10:10    


 


Magnesium  2.00 mg/dL (1.7-2.3)   10/09/19  10:10    


 


Total Bilirubin  1.00 mg/dL (0.1-1.2)   10/09/19  10:10    


 


AST  16 units/L (5-40)   10/09/19  10:10    


 


ALT  10 units/L (7-56)   10/09/19  10:10    


 


Alkaline Phosphatase  67 units/L ()   10/09/19  10:10    


 


Total Creatine Kinase  68 units/L ()   10/09/19  10:10    


 


CK-MB (CK-2)  2.5 ng/mL (0.0-4.0)   10/09/19  10:10    


 


CK-MB (CK-2) Rel Index  3.6  (0-4)   10/09/19  10:10    


 


Troponin T  < 0.010 ng/mL (0.00-0.029)   10/09/19  10:10    


 


Total Protein  7.0 g/dL (6.3-8.2)   10/09/19  10:10    


 


Albumin  3.6 g/dL (3.9-5)  L  10/09/19  10:10    


 


Albumin/Globulin Ratio  1.1 %  10/09/19  10:10    


 


TSH  3.800 mlU/mL (0.270-4.200)   10/09/19  10:10    


 


Urine Color  Yellow  (Yellow)   10/09/19  11:57    


 


Urine Turbidity  Slightly-cloudy  (Clear)   10/09/19  11:57    


 


Urine pH  5.0  (5.0-7.0)   10/09/19  11:57    


 


Ur Specific Gravity  1.012  (1.003-1.030)   10/09/19  11:57    


 


Urine Protein  <15 mg/dl mg/dL (Negative)   10/09/19  11:57    


 


Urine Glucose (UA)  Neg mg/dL (Negative)   10/09/19  11:57    


 


Urine Ketones  20 mg/dL (Negative)   10/09/19  11:57    


 


Urine Blood  Neg  (Negative)   10/09/19  11:57    


 


Urine Nitrite  Neg  (Negative)   10/09/19  11:57    


 


Urine Bilirubin  Neg  (Negative)   10/09/19  11:57    


 


Urine Urobilinogen  < 2.0 mg/dL (<2.0)   10/09/19  11:57    


 


Ur Leukocyte Esterase  Neg  (Negative)   10/09/19  11:57    


 


Urine WBC (Auto)  2.0 /HPF (0.0-6.0)   10/09/19  11:57    


 


Urine RBC (Auto)  3.0 /HPF (0.0-6.0)   10/09/19  11:57    


 


U Epithel Cells (Auto)  1.0 /HPF (0-13.0)   10/09/19  11:57    


 


Urine Bacteria (Auto)  1+ /HPF (Negative)   10/09/19  11:57    


 


Urine Mucus  Few /HPF  10/09/19  11:57    


 


Salicylates  < 0.3 mg/dL (2.8-20.0)  L  10/09/19  10:10    


 


Acetaminophen  < 5.0 ug/mL (10.0-30.0)  L  10/09/19  10:10

## 2019-10-09 NOTE — XRAY REPORT
CHEST 1 VIEW 



INDICATION: 

weak n/v sob.



COMPARISON: 

No comparison chest x-ray but there is a comparison CT abdomen from 9/8/2017



FINDINGS:

Support devices: None.



Heart: Cardiac enlargement and dextrocardia as identified on previous CT abdomen.

Pulmonary vasculature: Normal. 

Lungs/Pleura: The lungs are normally expanded and clear. No pleural effusion. 



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



IMPRESSION:

1. No acute findings.

2. Dextrocardia and cardiac enlargement but no CHF.



Signer Name: Toni Disla MD 

Signed: 10/9/2019 10:25 AM

 Workstation Name: [id number]

## 2019-10-09 NOTE — CAT SCAN REPORT
CT ABDOMEN AND PELVIS WITHOUT CONTRAST



INDICATION / CLINICAL INFORMATION:

weakness n/v. Altered mental status.



TECHNIQUE:

Axial CT images were obtained through the abdomen and pelvis without IV contrast.  All CT scans at [facility] are performed using CT dose reduction for ALARA by means of automated exposure control. 



COMPARISON:

CT dated 09/08/17



FINDINGS:

LOWER CHEST: Heart is enlarged but unchanged. No acute pulmonary or pleural findings.

LIVER: No acute abnormality.

GALLBLADDER: Distended with tiny gallstones. No inflammation.  

BILE DUCTS: No significant abnormality.

PANCREAS: No significant abnormality.

SPLEEN: Left upper quadrant splenosis is unchanged.

ADRENALS: No significant abnormality.

RIGHT KIDNEY and URETER: Small cyst with peripheral calcifications, unchanged. No acute abnormality.

LEFT KIDNEY and URETER: Small cyst, unchanged. No acute abnormality.



STOMACH and SMALL BOWEL: Stomach is not dilated. There is moderate to severe thickening and edema of 
loops of mid to distal small bowel in a pattern and distribution similar to the prior study. There is
 mild adjacent mesenteric edema. No pneumatosis or extraluminal gas.

COLON: Diffuse colonic diverticulosis is unchanged without acute inflammation. Moderate to large amou
nt of fecal material in the rectal vault. 

APPENDIX: Not definitely visualized.  

PERITONEUM: Small amount of free fluid in the abdomen primarily within a small left femoral hernia. N
o free air. No fluid collection.

LYMPH NODES: No significant adenopathy.

AORTA and ARTERIES: No significant abnormality. 

IVC and VEINS: No significant abnormality.



URINARY BLADDER: No significant abnormality.

REPRODUCTIVE ORGANS: Small calcified uterine fibroid. No acute abnormality.



ADDITIONAL FINDINGS: None.



SKELETAL SYSTEM: Advanced right hip degenerative arthrosis. Subjective osteopenia. No acute osseous a
bnormality.



IMPRESSION:

1. Moderate to severe thickening and edema of mid to distal small bowel similar to that seen on the p
rior study. Findings may represent enteritis or inflammatory bowel disease. 

2. Small left femoral hernia containing a small amount of fluid without significant change.

3. Diffuse clonic diverticulosis with no inflammation.

4. Moderate to large amount of fecal material in the rectal vault may represent fecal impaction.

5. Tiny gallstones without inflammation.



Signer Name: BRADY Hyde MD 

Signed: 10/9/2019 11:39 AM

 Workstation Name: [id number]

## 2019-10-10 LAB
ALBUMIN SERPL-MCNC: 3.4 G/DL (ref 3.9–5)
ALT SERPL-CCNC: 8 UNITS/L (ref 7–56)
BASOPHILS # (AUTO): 0 K/MM3 (ref 0–0.1)
BASOPHILS NFR BLD AUTO: 0.6 % (ref 0–1.8)
BUN SERPL-MCNC: 21 MG/DL (ref 7–17)
BUN/CREAT SERPL: 35 %
CALCIUM SERPL-MCNC: 8.7 MG/DL (ref 8.4–10.2)
EOSINOPHIL # BLD AUTO: 0.1 K/MM3 (ref 0–0.4)
EOSINOPHIL NFR BLD AUTO: 1.5 % (ref 0–4.3)
HCT VFR BLD CALC: 31.6 % (ref 30.3–42.9)
HEMOLYSIS INDEX: 0
HGB BLD-MCNC: 10.5 GM/DL (ref 10.1–14.3)
LYMPHOCYTES # BLD AUTO: 2.2 K/MM3 (ref 1.2–5.4)
LYMPHOCYTES NFR BLD AUTO: 32.4 % (ref 13.4–35)
MCHC RBC AUTO-ENTMCNC: 33 % (ref 30–34)
MCV RBC AUTO: 93 FL (ref 79–97)
MONOCYTES # (AUTO): 0.5 K/MM3 (ref 0–0.8)
MONOCYTES % (AUTO): 7.4 % (ref 0–7.3)
PLATELET # BLD: 250 K/MM3 (ref 140–440)
RBC # BLD AUTO: 3.4 M/MM3 (ref 3.65–5.03)

## 2019-10-10 RX ADMIN — CALCIUM CARBONATE (ANTACID) CHEW TAB 500 MG SCH MG: 500 CHEW TAB at 11:00

## 2019-10-10 RX ADMIN — FAMOTIDINE SCH MG: 20 TABLET ORAL at 11:00

## 2019-10-10 RX ADMIN — FAMOTIDINE SCH MG: 20 TABLET ORAL at 22:17

## 2019-10-10 RX ADMIN — SODIUM CHLORIDE SCH MLS/HR: 0.9 INJECTION, SOLUTION INTRAVENOUS at 13:56

## 2019-10-10 RX ADMIN — Medication SCH ML: at 22:17

## 2019-10-10 RX ADMIN — Medication SCH ML: at 11:00

## 2019-10-10 RX ADMIN — MIRTAZAPINE SCH MG: 15 TABLET, FILM COATED ORAL at 11:00

## 2019-10-10 NOTE — PROGRESS NOTE
Assessment and Plan


Assessment and plan: 


Acute gastroenteritis


iv fluids


narcotics for abd pain


CT shows edema, thickening small bowel





Dizziness and weakness


To r/o stroke


MRI Brain





GERD


cont h2 blockers





osteoarthritis





Full code status














History


Interval history: 


Dizziness


Gen weakness


Abdominal pain








Hospitalist Physical





- Physical exam


Narrative exam: 


Gen: Not in acute distress, lying in bed


HEENT: Normocephalic, atraumatic


Neck: supple, no JVD


Heart: S1 and S2 reg, no murmurs, rubs or gallop


Lungs: Clear to auscultation, no rhonchi, no wheeze


Abd: soft, non tender ,  no rebound tenderness, non distended, normal BS, 


Ext: No edema, no clubbing, no cyanosis 


Neuro: Awake, alert, moves all ext, no focal neurological signs








- Constitutional


Vitals: 


                                        











Temp Pulse Resp BP Pulse Ox


 


 98.0 F   46 L  16   154/56   98 


 


 10/10/19 07:46  10/10/19 07:46  10/10/19 07:46  10/10/19 07:46  10/10/19 07:46














Results





- Labs


CBC & Chem 7: 


                                 10/10/19 04:44





                                 10/10/19 04:44


Labs: 


                             Laboratory Last Values











WBC  6.9 K/mm3 (4.5-11.0)   10/10/19  04:44    


 


RBC  3.40 M/mm3 (3.65-5.03)  L  10/10/19  04:44    


 


Hgb  10.5 gm/dl (10.1-14.3)   10/10/19  04:44    


 


Hct  31.6 % (30.3-42.9)   10/10/19  04:44    


 


MCV  93 fl (79-97)   10/10/19  04:44    


 


MCH  31 pg (28-32)   10/10/19  04:44    


 


MCHC  33 % (30-34)   10/10/19  04:44    


 


RDW  13.6 % (13.2-15.2)   10/10/19  04:44    


 


Plt Count  250 K/mm3 (140-440)   10/10/19  04:44    


 


Lymph % (Auto)  32.4 % (13.4-35.0)   10/10/19  04:44    


 


Mono % (Auto)  7.4 % (0.0-7.3)  H  10/10/19  04:44    


 


Eos % (Auto)  1.5 % (0.0-4.3)   10/10/19  04:44    


 


Baso % (Auto)  0.6 % (0.0-1.8)   10/10/19  04:44    


 


Lymph #  2.2 K/mm3 (1.2-5.4)   10/10/19  04:44    


 


Mono #  0.5 K/mm3 (0.0-0.8)   10/10/19  04:44    


 


Eos #  0.1 K/mm3 (0.0-0.4)   10/10/19  04:44    


 


Baso #  0.0 K/mm3 (0.0-0.1)   10/10/19  04:44    


 


Add Manual Diff  Complete   10/09/19  10:10    


 


Total Counted  100   10/09/19  10:10    


 


Seg Neutrophils %  58.1 % (40.0-70.0)   10/10/19  04:44    


 


Seg Neuts % (Manual)  94.0 % (40.0-70.0)  H  10/09/19  10:10    


 


Band Neutrophils %  0 %  10/09/19  10:10    


 


Lymphocytes % (Manual)  2.0 % (13.4-35.0)  L  10/09/19  10:10    


 


Reactive Lymphs % (Man)  0 %  10/09/19  10:10    


 


Monocytes % (Manual)  4.0 % (0.0-7.3)   10/09/19  10:10    


 


Eosinophils % (Manual)  0 % (0.0-4.3)   10/09/19  10:10    


 


Basophils % (Manual)  0 % (0.0-1.8)   10/09/19  10:10    


 


Metamyelocytes %  0 %  10/09/19  10:10    


 


Myelocytes %  0 %  10/09/19  10:10    


 


Promyelocytes %  0 %  10/09/19  10:10    


 


Blast Cells %  0 %  10/09/19  10:10    


 


Nucleated RBC %  Not Reportable   10/09/19  10:10    


 


Seg Neutrophils #  4.0 K/mm3 (1.8-7.7)   10/10/19  04:44    


 


Seg Neutrophils # Man  14.1 K/mm3 (1.8-7.7)  H  10/09/19  10:10    


 


Band Neutrophils #  0.0 K/mm3  10/09/19  10:10    


 


Lymphocytes # (Manual)  0.3 K/mm3 (1.2-5.4)  L  10/09/19  10:10    


 


Abs React Lymphs (Man)  0.0 K/mm3  10/09/19  10:10    


 


Monocytes # (Manual)  0.6 K/mm3 (0.0-0.8)   10/09/19  10:10    


 


Eosinophils # (Manual)  0.0 K/mm3 (0.0-0.4)   10/09/19  10:10    


 


Basophils # (Manual)  0.0 K/mm3 (0.0-0.1)   10/09/19  10:10    


 


Metamyelocytes #  0.0 K/mm3  10/09/19  10:10    


 


Myelocytes #  0.0 K/mm3  10/09/19  10:10    


 


Promyelocytes #  0.0 K/mm3  10/09/19  10:10    


 


Blast Cells #  0.0 K/mm3  10/09/19  10:10    


 


WBC Morphology  Not Reportable   10/09/19  10:10    


 


Hypersegmented Neuts  Not Reportable   10/09/19  10:10    


 


Hyposegmented Neuts  Not Reportable   10/09/19  10:10    


 


Hypogranular Neuts  Not Reportable   10/09/19  10:10    


 


Smudge Cells  Not Reportable   10/09/19  10:10    


 


Toxic Granulation  Not Reportable   10/09/19  10:10    


 


Toxic Vacuolation  Not Reportable   10/09/19  10:10    


 


Dohle Bodies  Not Reportable   10/09/19  10:10    


 


Pelger-Huet Anomaly  Not Reportable   10/09/19  10:10    


 


George Rods  Not Reportable   10/09/19  10:10    


 


Platelet Estimate  Consistent w auto   10/09/19  10:10    


 


Clumped Platelets  Not Reportable   10/09/19  10:10    


 


Plt Clumps, EDTA  Not Reportable   10/09/19  10:10    


 


Large Platelets  Not Reportable   10/09/19  10:10    


 


Giant Platelets  Not Reportable   10/09/19  10:10    


 


Platelet Satelliting  Not Reportable   10/09/19  10:10    


 


Plt Morphology Comment  Not Reportable   10/09/19  10:10    


 


RBC Morphology  Normal   10/09/19  10:10    


 


Dimorphic RBCs  Not Reportable   10/09/19  10:10    


 


Polychromasia  Not Reportable   10/09/19  10:10    


 


Hypochromasia  Not Reportable   10/09/19  10:10    


 


Poikilocytosis  Not Reportable   10/09/19  10:10    


 


Anisocytosis  Not Reportable   10/09/19  10:10    


 


Microcytosis  Not Reportable   10/09/19  10:10    


 


Macrocytosis  Not Reportable   10/09/19  10:10    


 


Spherocytes  Not Reportable   10/09/19  10:10    


 


Pappenheimer Bodies  Not Reportable   10/09/19  10:10    


 


Sickle Cells  Not Reportable   10/09/19  10:10    


 


Target Cells  Not Reportable   10/09/19  10:10    


 


Tear Drop Cells  Not Reportable   10/09/19  10:10    


 


Ovalocytes  Not Reportable   10/09/19  10:10    


 


Helmet Cells  Not Reportable   10/09/19  10:10    


 


[place] Bodies  Not Reportable   10/09/19  10:10    


 


Cabot Rings  Not Reportable   10/09/19  10:10    


 


[place] Cells  Not Reportable   10/09/19  10:10    


 


Bite Cells  Not Reportable   10/09/19  10:10    


 


Crenated Cell  Not Reportable   10/09/19  10:10    


 


Elliptocytes  Not Reportable   10/09/19  10:10    


 


Acanthocytes (Spur)  Not Reportable   10/09/19  10:10    


 


Rouleaux  Not Reportable   10/09/19  10:10    


 


Hemoglobin C Crystals  Not Reportable   10/09/19  10:10    


 


Schistocytes  Not Reportable   10/09/19  10:10    


 


Malaria parasites  Not Reportable   10/09/19  10:10    


 


Kobe Bodies  Not Reportable   10/09/19  10:10    


 


Hem Pathologist Commnt  No   10/09/19  10:10    


 


PT  13.6 Sec. (12.2-14.9)   10/09/19  10:10    


 


INR  1.07  (0.87-1.13)   10/09/19  10:10    


 


APTT  33.8 Sec. (24.2-36.6)   10/09/19  10:10    


 


Thrombin Time  15.1 Sec. (15.1-19.6)   10/09/19  10:10    


 


Sodium  143 mmol/L (137-145)   10/10/19  04:44    


 


Potassium  4.0 mmol/L (3.6-5.0)   10/10/19  04:44    


 


Chloride  105.7 mmol/L ()   10/10/19  04:44    


 


Carbon Dioxide  28 mmol/L (22-30)   10/10/19  04:44    


 


Anion Gap  13 mmol/L  10/10/19  04:44    


 


BUN  21 mg/dL (7-17)  H  10/10/19  04:44    


 


Creatinine  0.6 mg/dL (0.7-1.2)  L  10/10/19  04:44    


 


Estimated GFR  > 60 ml/min  10/10/19  04:44    


 


BUN/Creatinine Ratio  35 %  10/10/19  04:44    


 


Glucose  87 mg/dL ()   10/10/19  04:44    


 


Hemoglobin A1c  5.5 % (4-6)   10/09/19  Unknown


 


Calcium  8.7 mg/dL (8.4-10.2)   10/10/19  04:44    


 


Magnesium  2.00 mg/dL (1.7-2.3)   10/09/19  10:10    


 


Total Bilirubin  0.50 mg/dL (0.1-1.2)   10/10/19  04:44    


 


AST  14 units/L (5-40)   10/10/19  04:44    


 


ALT  8 units/L (7-56)   10/10/19  04:44    


 


Alkaline Phosphatase  61 units/L ()   10/10/19  04:44    


 


Total Creatine Kinase  68 units/L ()   10/09/19  10:10    


 


CK-MB (CK-2)  2.5 ng/mL (0.0-4.0)   10/09/19  10:10    


 


CK-MB (CK-2) Rel Index  3.6  (0-4)   10/09/19  10:10    


 


Troponin T  < 0.010 ng/mL (0.00-0.029)   10/09/19  10:10    


 


Total Protein  6.3 g/dL (6.3-8.2)   10/10/19  04:44    


 


Albumin  3.4 g/dL (3.9-5)  L  10/10/19  04:44    


 


Albumin/Globulin Ratio  1.2 %  10/10/19  04:44    


 


TSH  3.800 mlU/mL (0.270-4.200)   10/09/19  10:10    


 


Urine Color  Yellow  (Yellow)   10/09/19  11:57    


 


Urine Turbidity  Slightly-cloudy  (Clear)   10/09/19  11:57    


 


Urine pH  5.0  (5.0-7.0)   10/09/19  11:57    


 


Ur Specific Gravity  1.012  (1.003-1.030)   10/09/19  11:57    


 


Urine Protein  <15 mg/dl mg/dL (Negative)   10/09/19  11:57    


 


Urine Glucose (UA)  Neg mg/dL (Negative)   10/09/19  11:57    


 


Urine Ketones  20 mg/dL (Negative)   10/09/19  11:57    


 


Urine Blood  Neg  (Negative)   10/09/19  11:57    


 


Urine Nitrite  Neg  (Negative)   10/09/19  11:57    


 


Urine Bilirubin  Neg  (Negative)   10/09/19  11:57    


 


Urine Urobilinogen  < 2.0 mg/dL (<2.0)   10/09/19  11:57    


 


Ur Leukocyte Esterase  Neg  (Negative)   10/09/19  11:57    


 


Urine WBC (Auto)  2.0 /HPF (0.0-6.0)   10/09/19  11:57    


 


Urine RBC (Auto)  3.0 /HPF (0.0-6.0)   10/09/19  11:57    


 


U Epithel Cells (Auto)  1.0 /HPF (0-13.0)   10/09/19  11:57    


 


Urine Bacteria (Auto)  1+ /HPF (Negative)   10/09/19  11:57    


 


Urine Mucus  Few /HPF  10/09/19  11:57    


 


Salicylates  < 0.3 mg/dL (2.8-20.0)  L  10/09/19  10:10    


 


Acetaminophen  < 5.0 ug/mL (10.0-30.0)  L  10/09/19  10:10    














Active Medications





- Current Medications


Current Medications: 














Generic Name Dose Route Start Last Admin





  Trade Name Freq  PRN Reason Stop Dose Admin


 


Acetaminophen  650 mg  10/09/19 21:27 





  Tylenol  PO  





  Q4H PRN  





  Pain MILD(1-3)/Fever >100.5/HA  


 


Calcium Carbonate/Glycine  500 mg  10/09/19 22:00  10/09/19 22:48





  Tums  PO   500 mg





  DAILY IJEOMA   Administration


 


Famotidine  20 mg  10/09/19 22:00  10/09/19 22:48





  Pepcid  PO   20 mg





  BID IJEOMA   Administration


 


Hydromorphone HCl  0.25 mg  10/09/19 21:27 





  Dilaudid  IV  





  Q3H PRN  





  Pain, Moderate (4-6)  


 


Sodium Chloride  1,000 mls @ 75 mls/hr  10/09/19 22:00 





  Nacl 0.9% 1000 Ml  IV  





  AS DIRECT IJEOMA  


 


Metoclopramide HCl  10 mg  10/09/19 21:27 





  Reglan  IV  





  Q6H PRN  





  Nausea And Vomiting  


 


Mirtazapine  15 mg  10/09/19 22:00  10/09/19 22:48





  Remeron  PO   15 mg





  DAILY IJEOMA   Administration


 


Ondansetron HCl  4 mg  10/09/19 21:27 





  Zofran  IV  





  Q8H PRN  





  Nausea And Vomiting  


 


Oxycodone/Acetaminophen  1 tab  10/09/19 21:27 





  Percocet 5/325  PO  





  Q6H PRN  





  Pain, Moderate (4-6)  


 


Sodium Chloride  10 ml  10/09/19 22:00  10/09/19 22:49





  Sodium Chloride Flush Syringe 10 Ml  IV   10 ml





  BID IJEOMA   Administration


 


Sodium Chloride  10 ml  10/09/19 21:27 





  Sodium Chloride Flush Syringe 10 Ml  IV  





  PRN PRN  





  LINE FLUSH

## 2019-10-10 NOTE — HISTORY AND PHYSICAL REPORT
CHIEF COMPLAINT:

1.  Nausea, vomiting, and diarrhea for 1 day.

2.  Severe weakness in both lower extremities.



HISTORY OF PRESENT ILLNESS:  An 84-year-old frail woman brought in for dizziness

and generalized weakness.  The patient normally walks down the stairs.  Unable

to walk since morning.  Also, has vomiting and diarrhea which is nearly

resolved.  Her weakness is also improved in both the lower extremities.



PAST MEDICAL HISTORY:  As mentioned, osteoarthritis and GERD.



PAST SURGICAL HISTORY:  Unavailable.



SOCIAL HISTORY:  Does not smoke.  No alcohol, no recreational drugs.



FAMILY HISTORY:  Hypertension.



REVIEW OF SYSTEMS:  Significant for weakness in both lower extremities. 

Otherwise, review of systems negative.



PHYSICAL EXAMINATION:

GENERAL:  Elderly female, cooperative during examination.

VITAL SIGNS:  Blood pressure is 150/83, temperature is 98.1, pulse is 76, sats

are 94%.

HEENT:  Unremarkable.  Pupils equal and reactive.

NECK:  Supple, no lymphadenopathy, no thyromegaly.

LUNGS:  Clear to auscultation and percussion.  Good air entry.

CARDIOVASCULAR:  S1, S2 heard.  No gallop, no murmur, no rub.  Apical impulse in

left fifth intercostal space and midclavicular line.

ABDOMEN:  Soft and benign.  No hepatosplenomegaly.  No guarding, no rigidity. 

Hernial orifices are normal.

EXTREMITIES:  Good pedal pulses.

CENTRAL NERVOUS SYSTEM:  Power is 5/5 in all 4 extremities.  Alert and oriented

x 4.

SKIN:  Normal.



LABORATORY DATA:  Significant for white count of 15,000, H and H 11.1 and 33.7,

platelet count is 263,000.  Electrolytes are normal.  Albumin is slightly low at

3.6.  Urine is normal.



ASSESSMENT AND PLAN:

1.  Acute gastroenteritis.  IV fluids for the time being.

2.  Ataxia, rule out acute cerebrovascular accident.  Transient ischemic attack

is a high possibility.  Only MRI for now.

3.  Debility.  PT, OT.

4.  Gastroesophageal reflux disease.  Continue PPIs.

5.  Deep venous thrombosis prophylaxis, heparin 5000 q. 12 and gastrointestinal

prophylaxis.



In summary, the patient had unsteady gait, which has resolved.  Dizziness and

nausea, which has resolved.  Admit for observation.  IV fluids.  Physical

therapy evaluation.





DD: 10/09/2019 

DT: 10/09/2019 

JOB# [id number]  [id number]

CATRACHITA/ELAYNE LEON

## 2019-10-10 NOTE — GASTROENTEROLOGY CONSULTATION
History of Present Illness





- Reason for Consult


Consult date: 10/10/19


abnormal ct scan, abdominal pain


Requesting physician: ALEXANDREA RUBIO





- History of Present Illness


The patient is a 83 yo aaf who presents due to dizziness/loc and vomiting.  

history obtained some from pt and rest from pt's son (pt does not recall details

prior to admission and has had some confusion since admission).  She lives alone

and has home health who comes to her home daily per pt's son.  She was 

previously having issues with appetite and weight loss; she was started on 

appetite stimulant and anti-acid a couple months ago.  She has had 10 lb weight 

gain since that time.  She currently denies abdominal pain and n/v has improved 

since admission.  no bowel habit changes and denies diarrhea/constipation or gi 

bleeding. 








Medications and Allergies


                                    Allergies











Allergy/AdvReac Type Severity Reaction Status Date / Time


 


No Known Allergies Allergy   Unverified 09/08/17 12:18











                                Home Medications











 Medication  Instructions  Recorded  Confirmed  Last Taken  Type


 


Calcium Carbonate [Tums 500MG CHEW] 500 mg PO DAILY 10/09/19 10/09/19 Unknown 

History


 


Mirtazapine [Remeron 15mg TAB] 15 mg PO DAILY 10/09/19 10/09/19 10/09/19 History


 


raNITIdine HCl [Zantac] 150 mg PO QDAY 10/09/19 10/09/19 10/09/19 History











Active Meds: 


Active Medications





Acetaminophen (Tylenol)  650 mg PO Q4H PRN


   PRN Reason: Pain MILD(1-3)/Fever >100.5/HA


   Last Admin: 10/10/19 13:31 Dose:  650 mg


   Documented by: 


Calcium Carbonate/Glycine (Tums)  500 mg PO DAILY [facility]


   Last Admin: 10/10/19 11:00 Dose:  500 mg


   Documented by: 


Famotidine (Pepcid)  20 mg PO BID [facility]


   Last Admin: 10/10/19 11:00 Dose:  20 mg


   Documented by: 


Hydromorphone HCl (Dilaudid)  0.25 mg IV Q3H PRN


   PRN Reason: Pain, Moderate (4-6)


Sodium Chloride (Nacl 0.9% 1000 Ml)  1,000 mls @ 75 mls/hr IV AS DIRECT [facility]


   Last Admin: 10/10/19 13:56 Dose:  75 mls/hr


   Documented by: 


Metoclopramide HCl (Reglan)  10 mg IV Q6H PRN


   PRN Reason: Nausea And Vomiting


Mirtazapine (Remeron)  15 mg PO DAILY [facility]


   Last Admin: 10/10/19 11:00 Dose:  15 mg


   Documented by: 


Ondansetron HCl (Zofran)  4 mg IV Q8H PRN


   PRN Reason: Nausea And Vomiting


Oxycodone/Acetaminophen (Percocet 5/325)  1 tab PO Q6H PRN


   PRN Reason: Pain, Moderate (4-6)


Sodium Chloride (Sodium Chloride Flush Syringe 10 Ml)  10 ml IV BID [facility]


   Last Admin: 10/10/19 11:00 Dose:  10 ml


   Documented by: 


Sodium Chloride (Sodium Chloride Flush Syringe 10 Ml)  10 ml IV PRN PRN


   PRN Reason: LINE FLUSH





Reviewed/updated patient's home and current medications





Review of Systems





- Review of Systems


All systems: negative (per HPI)





Exam





- Constitutional


Vital Signs: 


                                        











Temp Pulse Resp BP Pulse Ox


 


 99.7 F H  56 L  18   153/55   98 


 


 10/10/19 12:31  10/10/19 12:31  10/10/19 13:31  10/10/19 12:31  10/10/19 12:31











General appearance: no acute distress, other (thin female)





- EENT


Eyes: PERRL, EOM intact


ENT: poor dentition





- Respiratory


Respiratory effort: normal


Respiratory: bilateral: CTA





- Cardiovascular


Rhythm: regular


Heart Sounds: Present: S1 & S2


Extremities: No edema





- Gastrointestinal


General gastrointestinal: Present: soft, non-tender, non-distended





- Neurologic


Neurological: oriented to person





- Psychiatric


Psychiatric: appropriate mood/affect





- Labs


CBC & Chem 7: 


                                 10/10/19 04:44





                                 10/10/19 04:44


Lab Results: 


                         Laboratory Results - last 24 hr











  10/09/19 10/10/19 10/10/19





  Unknown 04:44 04:44


 


WBC   6.9 


 


RBC   3.40 L 


 


Hgb   10.5 


 


Hct   31.6 


 


MCV   93 


 


MCH   31 


 


MCHC   33 


 


RDW   13.6 


 


Plt Count   250 


 


Lymph % (Auto)   32.4 


 


Mono % (Auto)   7.4 H 


 


Eos % (Auto)   1.5 


 


Baso % (Auto)   0.6 


 


Lymph #   2.2 


 


Mono #   0.5 


 


Eos #   0.1 


 


Baso #   0.0 


 


Seg Neutrophils %   58.1 


 


Seg Neutrophils #   4.0 


 


Sodium    143


 


Potassium    4.0


 


Chloride    105.7


 


Carbon Dioxide    28


 


Anion Gap    13


 


BUN    21 H


 


Creatinine    0.6 L


 


Estimated GFR    > 60


 


BUN/Creatinine Ratio    35


 


Glucose    87


 


Hemoglobin A1c  5.5  


 


Calcium    8.7


 


Total Bilirubin    0.50


 


AST    14


 


ALT    8


 


Alkaline Phosphatase    61


 


Total Protein    6.3


 


Albumin    3.4 L


 


Albumin/Globulin Ratio    1.2














- Imaging


CT Scan: report reviewed





Assessment and Plan


1. Abdominal pain -resolved.  


2. Nausea/vomiting - resolved, tolerating po w/o further episodes of emesis 

since admission


3. Abnormal small bowel findings - findings of small bowel 

thickening/inflammation of unclear significance/etiology.  this was seen on ct 

scan in 2017 with similar appearance.  given improved symptoms, may be acute 

gastroenteritis episode.  if symptoms return or other changes, would obtain CT 

enterography. 





given improved symptoms, no further inpt recommendations from gi at this time.  

will sign off, please call as needed or with questions.

## 2019-10-10 NOTE — CONSULTATION
History of Present Illness


Consult date: 10/10/19


Reason for Consult: Dizziness


Chief complaint: 


Nausea, vomiting, diarrhea, dizziness





History of present illness: 


Patient is an 84-year-old woman with a history of GERD and osteoarthritis.  

Yesterday, the patient reportedly began experiencing nausea, vomiting, diarrhea,

and had some difficulty walking on the stairs.  Patient also reportedly 

experienced some symptoms of dizziness, however on questioning, the patient s

tates that she has not experienced any dizziness.  She states that symptoms of 

unsteady gait and dizziness have resolved at this time.  Patient was previously 

complaining of abdominal pain, however this is also improved today.  Neurology 

was consulted for ataxia and dizziness.








Past History


Past Medical History: other (GERD and osteoarthritis)


Social history: no significant social history, Lives alone


Family history: no significant family history





Medications and Allergies


                                    Allergies











Allergy/AdvReac Type Severity Reaction Status Date / Time


 


No Known Allergies Allergy   Unverified 09/08/17 12:18











                                Home Medications











 Medication  Instructions  Recorded  Confirmed  Last Taken  Type


 


Calcium Carbonate [Tums 500MG CHEW] 500 mg PO DAILY 10/09/19 10/09/19 Unknown 

History


 


Mirtazapine [Remeron 15mg TAB] 15 mg PO DAILY 10/09/19 10/09/19 10/09/19 History


 


raNITIdine HCl [Zantac] 150 mg PO QDAY 10/09/19 10/09/19 10/09/19 History











Active Meds: 


Active Medications





Acetaminophen (Tylenol)  650 mg PO Q4H PRN


   PRN Reason: Pain MILD(1-3)/Fever >100.5/HA


   Last Admin: 10/10/19 13:31 Dose:  650 mg


   Documented by: 


Calcium Carbonate/Glycine (Tums)  500 mg PO DAILY [facility]


   Last Admin: 10/10/19 11:00 Dose:  500 mg


   Documented by: 


Famotidine (Pepcid)  20 mg PO BID [facility]


   Last Admin: 10/10/19 11:00 Dose:  20 mg


   Documented by: 


Hydromorphone HCl (Dilaudid)  0.25 mg IV Q3H PRN


   PRN Reason: Pain, Moderate (4-6)


Sodium Chloride (Nacl 0.9% 1000 Ml)  1,000 mls @ 75 mls/hr IV AS DIRECT [facility]


   Last Admin: 10/10/19 13:56 Dose:  75 mls/hr


   Documented by: 


Metoclopramide HCl (Reglan)  10 mg IV Q6H PRN


   PRN Reason: Nausea And Vomiting


Mirtazapine (Remeron)  15 mg PO DAILY [facility]


   Last Admin: 10/10/19 11:00 Dose:  15 mg


   Documented by: 


Ondansetron HCl (Zofran)  4 mg IV Q8H PRN


   PRN Reason: Nausea And Vomiting


Oxycodone/Acetaminophen (Percocet 5/325)  1 tab PO Q6H PRN


   PRN Reason: Pain, Moderate (4-6)


Sodium Chloride (Sodium Chloride Flush Syringe 10 Ml)  10 ml IV BID [facility]


   Last Admin: 10/10/19 11:00 Dose:  10 ml


   Documented by: 


Sodium Chloride (Sodium Chloride Flush Syringe 10 Ml)  10 ml IV PRN PRN


   PRN Reason: LINE FLUSH











Review of Systems


All systems: negative


Gastrointestinal: nausea, vomiting, diarrhea


Neurological: balance difficulties





Physical Examination





- Vital Signs


Vital Signs: 


                                   Vital Signs











Pulse Ox


 


 98 


 


 10/09/19 09:38














- Physical Exam


Narrative exam: 


Patient is alert, awake, oriented 4, follows complex commands.  At baseline, 

the patient has difficulty hearing bilaterally.  Pupils equal, round, reactive 

to light, visual fields full, EOMI, no facial weakness noted, bilaterally intact

 to light touch, tongue midline.  No dysarthria or aphasia noted.  No nystagmus 

noted.  5 out of 5 strength noted in all extremities.  2+ reflexes throughout.  

Bilaterally intact to light touch in all extremities.  Bilaterally intact to 

finger to nose and heel to shin.  Patient able to walk without any difficulty 

with a walker, and uses a walker at baseline.








- Constitutional


General appearance: comfortable





- EENT


EENT: Present: ATNC, PERRL, mucous membranes moist, vision intact, hearing 

diminished





- Respiratory


Respiratory: Present: lungs clear, normal breath sounds





- Cardiovascular


Cardiovascular: Present: regular rate, normal S1, normal S2


Extremities: Present: no clubbing, cyanosis, no inflammation





- Gastrointestinal


Gastrointestinal: Present: normoactive bowel sounds, soft, non-tender





- Integumentary


Integumentary: Present: normal





- Musculoskeletal


Musculoskeletal: Present: no fluid collection, normal range of motion





- Psychiatric


Psychiatric: Present: mood/affect appropriate





Results





- Laboratory Findings


CBC and BMP: 


                                 10/10/19 04:44





                                 10/10/19 04:44


Abnormal Lab Findings: 


                                  Abnormal Labs











  10/09/19 10/09/19 10/09/19





  10:10 10:10 10:10


 


WBC  15.0 H  


 


RBC  3.62 L  


 


Mono % (Auto)   


 


Seg Neuts % (Manual)  94.0 H  


 


Lymphocytes % (Manual)  2.0 L  


 


Seg Neutrophils # Man  14.1 H  


 


Lymphocytes # (Manual)  0.3 L  


 


BUN   


 


Creatinine   0.6 L 


 


Glucose   125 H 


 


Albumin   3.6 L 


 


Salicylates    < 0.3 L


 


Acetaminophen   














  10/09/19 10/10/19 10/10/19





  10:10 04:44 04:44


 


WBC   


 


RBC   3.40 L 


 


Mono % (Auto)   7.4 H 


 


Seg Neuts % (Manual)   


 


Lymphocytes % (Manual)   


 


Seg Neutrophils # Man   


 


Lymphocytes # (Manual)   


 


BUN    21 H


 


Creatinine    0.6 L


 


Glucose   


 


Albumin    3.4 L


 


Salicylates   


 


Acetaminophen  < 5.0 L  














Assessment and Plan


Patient is an 84-year-old woman with a history of GERD and osteoarthritis, who 

p/w nausea, vomiting, diarrhea, and reportedly also had symptoms of dizziness 

and unsteady gait.  On examination, and on questioning the patient, symptoms of 

dizziness and unsteady gait have completely resolved.  Patient does not endorse 

that she had any dizziness yesterday.  According the patient's clinical 

findings, it is possible that she had some mild dizziness and unsteady gait due 

to underlying nausea, vomiting, and diarrhea/gastroenteritis.





Plan:


1. Dizziness/Unsteady gait:


- Symptoms have resolved


- Likely were secondary to nausea/vomiting/diarrhea/gastroenteritis


- MRI brain did not reveal any acute abnormality


-No indication of further neurologic workup at this time, as patient does not 

have any symptoms currently.


Continue to further workup and manage symptoms of nausea, vomiting, diarrhea 

per primary team.


- Will sign off, as neurologic investigations are completed, and symptoms have 

resolved.  Please call with any questions.





Thank you for allowing me to take part in the care of this patient.





Abdirashid Fitch MD


Neurology

## 2019-10-10 NOTE — MAGNETIC RESONANCE REPORT
MRI BRAIN WITHOUT CONTRAST 



INDICATION / CLINICAL INFORMATION:

ataxia.



TECHNIQUE:

Multisequence, multiplanar images were obtained.



COMPARISON: 

CT head without contrast performed 10/9/2019



FINDINGS:

CEREBRAL and CEREBELLAR HEMISPHERES: Diffuse cortical volume loss and moderate chronic white matter c
hanges are again noted. No chronic infarct.  No acute hemorrhage.  No diffusion restriction to sugges
t acute infarct.  No extra-axial fluid collection.

VENTRICLES: Normal in size and configuration for age.  

 

VISUALIZED ORBITS: No significant abnormality.

VISUALIZED PARANASAL SINUSES: Mild mucosal thickening is noted in the left maxillary sinus and bilate
ral ethmoid air cells. The remaining sinuses are clear.



ADDITIONAL FINDINGS: None.



IMPRESSION:

No acute intracranial abnormality. Advanced volume loss and chronic white matter changes.



Signer Name: Dionte Pal Jr, MD 

Signed: 10/10/2019 3:22 PM

 Workstation Name: [id number]

## 2019-10-11 VITALS — DIASTOLIC BLOOD PRESSURE: 56 MMHG | SYSTOLIC BLOOD PRESSURE: 157 MMHG

## 2019-10-11 RX ADMIN — MIRTAZAPINE SCH MG: 15 TABLET, FILM COATED ORAL at 10:26

## 2019-10-11 RX ADMIN — FAMOTIDINE SCH MG: 20 TABLET ORAL at 10:26

## 2019-10-11 RX ADMIN — CALCIUM CARBONATE (ANTACID) CHEW TAB 500 MG SCH MG: 500 CHEW TAB at 10:26

## 2019-10-11 RX ADMIN — SODIUM CHLORIDE SCH MLS/HR: 0.9 INJECTION, SOLUTION INTRAVENOUS at 03:37

## 2019-10-11 RX ADMIN — Medication SCH ML: at 10:27

## 2019-10-11 NOTE — DISCHARGE SUMMARY
Providers





- Providers


Date of Admission: 


10/09/19 12:25





Date of discharge: 10/11/19


Attending physician: 


ALEXANDREA RUBIO





                                        





10/09/19 21:27


Consult to Physician [CONS] Routine 


   Comment: 


   Consulting Provider: JOANA LLOYD


   Physician Instructions: 


   Reason For Exam: ataxia





10/10/19 07:27


Physical Therapy Evaluation and Treat [CONS] Routine 


   Comment: 


   Reason For Exam: Debility





10/10/19 13:43


Consult to Physician [CONS] Routine 


   Comment: 


   Consulting Provider: KASEY GOMEZ


   Physician Instructions: 


   Reason For Exam: Abdominal pain











Primary care physician: 


ZEINA NARANJO MD








Hospitalization


Condition: Stable





Exam





- Constitutional


Vitals: 


                                        











Temp Pulse Resp BP Pulse Ox


 


 97.8 F   56 L  18   137/55   99 


 


 10/11/19 07:47  10/11/19 08:00  10/11/19 07:47  10/11/19 07:47  10/11/19 07:47














Plan


Activity: advance as tolerated


Diet: regular


Plan of Treatment: 


1.Follow up with PCP in 1 week.


2.Follow up with Dr. Rosendo Duval GI in 1 week


Follow up with: 


PRIMARY CARE,MD [Referring] - 3-5 Days

## 2019-12-11 ENCOUNTER — HOSPITAL ENCOUNTER (EMERGENCY)
Dept: HOSPITAL 5 - ED | Age: 84
LOS: 1 days | Discharge: HOME | End: 2019-12-12
Payer: MEDICARE

## 2019-12-11 VITALS — SYSTOLIC BLOOD PRESSURE: 100 MMHG | DIASTOLIC BLOOD PRESSURE: 44 MMHG

## 2019-12-11 DIAGNOSIS — Z86.2: ICD-10-CM

## 2019-12-11 DIAGNOSIS — R42: Primary | ICD-10-CM

## 2019-12-11 DIAGNOSIS — Z98.890: ICD-10-CM

## 2019-12-11 DIAGNOSIS — Z79.899: ICD-10-CM

## 2019-12-11 DIAGNOSIS — M19.90: ICD-10-CM

## 2019-12-11 LAB
ALBUMIN SERPL-MCNC: 4 G/DL (ref 3.9–5)
ALT SERPL-CCNC: 10 UNITS/L (ref 7–56)
BASOPHILS # (AUTO): 0 K/MM3 (ref 0–0.1)
BASOPHILS NFR BLD AUTO: 0.5 % (ref 0–1.8)
BILIRUB DIRECT SERPL-MCNC: < 0.2 MG/DL (ref 0–0.2)
BILIRUB UR QL STRIP: (no result)
BLOOD UR QL VISUAL: (no result)
BUN SERPL-MCNC: 18 MG/DL (ref 7–17)
BUN/CREAT SERPL: 30 %
CALCIUM SERPL-MCNC: 8.9 MG/DL (ref 8.4–10.2)
EOSINOPHIL # BLD AUTO: 0.2 K/MM3 (ref 0–0.4)
EOSINOPHIL NFR BLD AUTO: 2.3 % (ref 0–4.3)
HCT VFR BLD CALC: 33 % (ref 30.3–42.9)
HEMOLYSIS INDEX: 5
HGB BLD-MCNC: 11.2 GM/DL (ref 10.1–14.3)
LYMPHOCYTES # BLD AUTO: 4 K/MM3 (ref 1.2–5.4)
LYMPHOCYTES NFR BLD AUTO: 46.7 % (ref 13.4–35)
MCHC RBC AUTO-ENTMCNC: 34 % (ref 30–34)
MCV RBC AUTO: 94 FL (ref 79–97)
MONOCYTES # (AUTO): 0.5 K/MM3 (ref 0–0.8)
MONOCYTES % (AUTO): 6.1 % (ref 0–7.3)
MUCOUS THREADS #/AREA URNS HPF: (no result) /HPF
PH UR STRIP: 8 [PH] (ref 5–7)
PLATELET # BLD: 239 K/MM3 (ref 140–440)
PROT UR STRIP-MCNC: (no result) MG/DL
RBC # BLD AUTO: 3.51 M/MM3 (ref 3.65–5.03)
RBC #/AREA URNS HPF: 1 /HPF (ref 0–6)
UROBILINOGEN UR-MCNC: < 2 MG/DL (ref ?–2)
WBC #/AREA URNS HPF: 1 /HPF (ref 0–6)

## 2019-12-11 PROCEDURE — 93005 ELECTROCARDIOGRAM TRACING: CPT

## 2019-12-11 PROCEDURE — 83690 ASSAY OF LIPASE: CPT

## 2019-12-11 PROCEDURE — 80076 HEPATIC FUNCTION PANEL: CPT

## 2019-12-11 PROCEDURE — 36415 COLL VENOUS BLD VENIPUNCTURE: CPT

## 2019-12-11 PROCEDURE — 93010 ELECTROCARDIOGRAM REPORT: CPT

## 2019-12-11 PROCEDURE — 99285 EMERGENCY DEPT VISIT HI MDM: CPT

## 2019-12-11 PROCEDURE — 85025 COMPLETE CBC W/AUTO DIFF WBC: CPT

## 2019-12-11 PROCEDURE — 96374 THER/PROPH/DIAG INJ IV PUSH: CPT

## 2019-12-11 PROCEDURE — 80048 BASIC METABOLIC PNL TOTAL CA: CPT

## 2019-12-11 PROCEDURE — 81001 URINALYSIS AUTO W/SCOPE: CPT

## 2019-12-11 PROCEDURE — 84484 ASSAY OF TROPONIN QUANT: CPT

## 2019-12-11 PROCEDURE — 70450 CT HEAD/BRAIN W/O DYE: CPT

## 2019-12-11 NOTE — EMERGENCY DEPARTMENT REPORT
ED Dizziness HPI





- General


Chief Complaint: Altered Mental Status


Stated Complaint: DIZZY/WEAKNESS


Time Seen by Provider: 12/11/19 18:13


Source: EMS


Mode of arrival: Stretcher


Limitations: Altered Mental Status





- History of Present Illness


Initial Comments: 





Patient is 84 years old female with no significant past medical history except 

for arthritis.  Patient brought to the emergency room via EMS for dizziness, 

nausea and vomiting started today.  Patient is coming from a nursing home 

facility.  Patient is alert and answering question appropriately.  Patient is 

moving all 4 extremities.  With no focal weakness or numbness or tingling 

sensation.  Patient denied any fever, chills, chest pain, shortness of breath.  

Patient had similar episode one month ago for which she was admitted to the 

hospital for IV fluids and further management.


MD Complaint: dizziness, lightheadedness


-: This afternoon


Timing: gradual onset


Description: sense of movement, "room spinning", lightheadedness


History of Same: Yes


Severity: moderate


Improves With: remaining still





- Related Data


                                Home Medications











 Medication  Instructions  Recorded  Confirmed  Last Taken


 


Calcium Carbonate [Tums 500MG CHEW] 500 mg PO DAILY 10/09/19 10/09/19 Unknown


 


Mirtazapine [Remeron 15mg TAB] 15 mg PO DAILY 10/09/19 10/09/19 10/09/19


 


raNITIdine HCl [Zantac] 150 mg PO QDAY 10/09/19 10/09/19 10/09/19











                                    Allergies











Allergy/AdvReac Type Severity Reaction Status Date / Time


 


No Known Allergies Allergy   Unverified 09/08/17 12:18














ED Review of Systems


ROS: 


Stated complaint: DIZZY/WEAKNESS


Other details as noted in HPI





Comment: All other systems reviewed and negative


Constitutional: denies: chills, diaphoresis


Respiratory: denies: cough, shortness of breath, SOB with exertion


Cardiovascular: denies: chest pain, palpitations


Gastrointestinal: nausea, vomiting.  denies: abdominal pain, diarrhea, 

constipation, hematemesis, melena, hematochezia


Musculoskeletal: denies: back pain


Neurological: vertigo.  denies: headache, weakness, numbness, paresthesias, 

confusion





ED Past Medical Hx





- Past Medical History


Hx Heart Attack/AMI: No


Hx Arthritis: Yes (left hand partially contracted)


Additional medical history: anemia





- Surgical History


Additional Surgical History: arm surgery





- Social History


Smoking Status: Never Smoker


Substance Use Type: None





- Medications


Home Medications: 


                                Home Medications











 Medication  Instructions  Recorded  Confirmed  Last Taken  Type


 


Calcium Carbonate [Tums 500MG CHEW] 500 mg PO DAILY 10/09/19 10/09/19 Unknown 

History


 


Mirtazapine [Remeron 15mg TAB] 15 mg PO DAILY 10/09/19 10/09/19 10/09/19 History


 


raNITIdine HCl [Zantac] 150 mg PO QDAY 10/09/19 10/09/19 10/09/19 History














ED Physical Exam





- General


Limitations: Altered Mental Status


General appearance: alert, in no apparent distress





- Head


Head exam: Present: atraumatic, normocephalic, normal inspection





- Eye


Eye exam: Present: normal appearance, PERRL





- ENT


ENT exam: Present: mucous membranes dry





- Neck


Neck exam: Present: normal inspection.  Absent: tenderness, meningismus





- Respiratory


Respiratory exam: Present: normal lung sounds bilaterally.  Absent: respiratory 

distress, wheezes, rales, rhonchi, chest wall tenderness, accessory muscle use, 

decreased breath sounds, prolonged expiratory





- Cardiovascular


Cardiovascular Exam: Present: regular rate, normal rhythm, normal heart sounds





- GI/Abdominal


GI/Abdominal exam: Present: soft, normal bowel sounds.  Absent: distended, 

tenderness, guarding, rebound, rigid, organomegaly, mass, bruit, pulsatile mass,

 hernia





- Extremities Exam


Extremities exam: Present: normal inspection, full ROM, normal capillary refill





- Back Exam


Back exam: Present: normal inspection, full ROM.  Absent: CVA tenderness (R), 

CVA tenderness (L), muscle spasm, paraspinal tenderness, vertebral tenderness





- Neurological Exam


Neurological exam: Present: alert, oriented X3, CN II-XII intact.  Absent: motor

 sensory deficit





- Psychiatric


Psychiatric exam: Present: normal mood





- Skin


Skin exam: Present: warm, intact, normal color





ED Course


                                   Vital Signs











  12/11/19 12/11/19 12/11/19





  17:37 17:58 18:00


 


Temperature 98 F  


 


Pulse Rate 54 L 73 49 L


 


Respiratory 12 14 23





Rate   


 


Blood Pressure   


 


Blood Pressure 146/61  





[left arm]   


 


O2 Sat by Pulse 95 99 98





Oximetry   














  12/11/19 12/11/19 12/11/19





  18:24 18:30 19:14


 


Temperature   


 


Pulse Rate 50 L 51 L 


 


Respiratory 14 18 20





Rate   


 


Blood Pressure   151/67


 


Blood Pressure   





[left arm]   


 


O2 Sat by Pulse 99 99 94





Oximetry   














  12/11/19 12/11/19 12/11/19





  19:16 19:30 19:46


 


Temperature   


 


Pulse Rate   48 L


 


Respiratory 25 H 20 11 L





Rate   


 


Blood Pressure 151/67 151/67 151/67


 


Blood Pressure   





[left arm]   


 


O2 Sat by Pulse 99 96 100





Oximetry   














  12/11/19 12/11/19 12/11/19





  20:00 20:16 20:30


 


Temperature   


 


Pulse Rate 50 L 57 L 50 L


 


Respiratory 14 18 10 L





Rate   


 


Blood Pressure 151/67 105/85 105/85


 


Blood Pressure   





[left arm]   


 


O2 Sat by Pulse 99 97 99





Oximetry   














  12/11/19 12/11/19 12/11/19





  20:46 21:00 21:16


 


Temperature   


 


Pulse Rate 50 L 51 L 49 L


 


Respiratory 16 16 13





Rate   


 


Blood Pressure 105/85 100/44 100/44


 


Blood Pressure   





[left arm]   


 


O2 Sat by Pulse 99 100 99





Oximetry   














  12/11/19 12/11/19 12/11/19





  21:30 21:45 22:00


 


Temperature   


 


Pulse Rate 45 L 47 L 55 L


 


Respiratory 7 L 15 11 L





Rate   


 


Blood Pressure 100/44 100/44 117/42


 


Blood Pressure   





[left arm]   


 


O2 Sat by Pulse 99 97 98





Oximetry   














  12/11/19 12/11/19 12/11/19





  22:16 22:30 22:46


 


Temperature   


 


Pulse Rate 47 L 45 L 55 L


 


Respiratory 16 14 15





Rate   


 


Blood Pressure 100/44 100/44 100/44


 


Blood Pressure   





[left arm]   


 


O2 Sat by Pulse 100 100 100





Oximetry   














ED Medical Decision Making





- Lab Data


Result diagrams: 


                                12/11/19 Unknown





                                12/11/19 Unknown





- EKG Data


-: EKG Interpreted by Me


EKG shows normal: sinus rhythm


Rate: bradycardia





- EKG Data


Interpretation: no acute changes





- Radiology Data


Radiology results: report reviewed





- Medical Decision Making





Patient is 84 years old female with no significant past medical history except 

for arthritis.  Patient brought to the emergency room via EMS for dizziness, 

nausea and vomiting started today.  Patient is coming from a nursing [facility].  Patient is alert and answering question appropriately.  Patient is moving 

all 4 extremities.  With no focal weakness or numbness or tingling sensation.  

Patient denied any fever, chills, chest pain, shortness of breath.  Patient had 

similar episode one month ago for which she was admitted to the hospital for IV 

fluids and further management.





Patient received Zofran and meclizine.  Patient stated that she feels much 

better no nausea or vomiting observed in the ER.  CT brain is negative for acute

finding.  EKG is unremarkable.  Labs reviewed and unremarkable.  Patient son is 

in the room and he is taking patient was same.  Patient advised to return to the

ER if symptoms are not improved.


Critical care attestation.: 


If time is entered above; I have spent that time in minutes in the direct care 

of this critically ill patient, excluding procedure time.








ED Disposition


Clinical Impression: 


 Dizziness, Vomiting





Disposition: DC-01 TO HOME OR SELFCARE


Is pt being admited?: No


Condition: Stable


Instructions:  Dizziness (ED), Acute Nausea and Vomiting (ED)


Referrals: 


PRIMARY CARE,MD [Primary Care Provider] - 3-5 Days

## 2019-12-11 NOTE — CAT SCAN REPORT
CT HEAD WITHOUT CONTRAST



INDICATION / CLINICAL INFORMATION:

AMS/Dizziness.



TECHNIQUE:

All CT scans at this location are performed using CT dose reduction for ALARA by means of automated e
xposure control. 



COMPARISON:

Head CT 10/9/2019 and MRI brain 10/10/2019



FINDINGS:

HEMORRHAGE: No evidence of intracranial hemorrhage or extra-axial fluid collection.

EXTRA-AXIAL SPACES: Cortical sulci and sylvian fissures are enlarged reflecting a degree of parenchym
al volume loss which is within normal limits for the patient's age of 84 years. Basilar cisterns have
 an unremarkable appearance.

VENTRICULAR SYSTEM: The third and lateral ventricles are enlarged reflecting presence of age related 
parenchymal volume loss.

CEREBRAL PARENCHYMA: Periventricular and deep white matter lucency is observed. This is probably seco
ndary to microvascular ischemic change. There is no indication of recent infarction. An area of encep
halomalacia is observed in the right parietal lobe secondary to remote small right MCA infarction. Th
is is unchanged.. Evaluation for acute ischemia could be better achieved utilizing magnetic resonance
 imaging.

MIDLINE SHIFT OR HERNIATION: There is no mass effect.

CEREBELLUM / BRAINSTEM: Brainstem and cerebellum have an unremarkable appearance.

INTRACRANIAL VESSELS:Calcified atherosclerotic plaque is present along the course of the cavernous se
gments of both internal carotid arteries. Similar findings are seen at the distal vertebral arteries.


ORBITS: visualized portions of the orbits have an unremarkable appearance.

SOFT TISSUES of HEAD: No significant abnormality.

CALVARIUM: Evaluation of bone windows reveals no abnormalities.

PARANASAL SINUSES / MASTOID AIR CELLS: Paranasal sinuses are free from inflammatory mucosal disease. 
Mastoid air cells are normally pneumatized.





IMPRESSION:

1. Age-appropriate parenchymal volume loss and microvascular ischemic change.

2. Evidence of remote right parietal infarction.

3. No acute intracranial abnormalities are identified. No significant interval change.



Signer Name: Mahesh Mark MD 

Signed: 12/11/2019 7:16 PM

 Workstation Name: [id number]